# Patient Record
Sex: FEMALE | Race: WHITE | Employment: UNEMPLOYED | URBAN - METROPOLITAN AREA
[De-identification: names, ages, dates, MRNs, and addresses within clinical notes are randomized per-mention and may not be internally consistent; named-entity substitution may affect disease eponyms.]

---

## 2021-12-20 ENCOUNTER — APPOINTMENT (OUTPATIENT)
Dept: GENERAL RADIOLOGY | Age: 66
DRG: 193 | End: 2021-12-20
Payer: MEDICARE

## 2021-12-20 ENCOUNTER — HOSPITAL ENCOUNTER (INPATIENT)
Age: 66
LOS: 4 days | Discharge: HOME | DRG: 193 | End: 2021-12-24
Attending: EMERGENCY MEDICINE | Admitting: INTERNAL MEDICINE
Payer: MEDICARE

## 2021-12-20 DIAGNOSIS — R05.9 COUGH: ICD-10-CM

## 2021-12-20 DIAGNOSIS — J96.01 ACUTE RESPIRATORY FAILURE WITH HYPOXIA (HCC): Primary | ICD-10-CM

## 2021-12-20 DIAGNOSIS — J18.9 MULTIFOCAL PNEUMONIA: ICD-10-CM

## 2021-12-20 DIAGNOSIS — R51.9 NONINTRACTABLE HEADACHE, UNSPECIFIED CHRONICITY PATTERN, UNSPECIFIED HEADACHE TYPE: ICD-10-CM

## 2021-12-20 DIAGNOSIS — R52 BODY ACHES: ICD-10-CM

## 2021-12-20 DIAGNOSIS — R06.02 SHORTNESS OF BREATH: ICD-10-CM

## 2021-12-20 PROBLEM — J96.21 ACUTE AND CHRONIC RESPIRATORY FAILURE WITH HYPOXIA (HCC): Status: ACTIVE | Noted: 2021-12-20

## 2021-12-20 LAB
A/G RATIO: 1.1 (ref 1.1–2.2)
ALBUMIN SERPL-MCNC: 3.8 G/DL (ref 3.4–5)
ALP BLD-CCNC: 94 U/L (ref 40–129)
ALT SERPL-CCNC: 21 U/L (ref 10–40)
ANION GAP SERPL CALCULATED.3IONS-SCNC: 11 MMOL/L (ref 3–16)
AST SERPL-CCNC: 24 U/L (ref 15–37)
BASOPHILS ABSOLUTE: 0.1 K/UL (ref 0–0.2)
BASOPHILS RELATIVE PERCENT: 1 %
BILIRUB SERPL-MCNC: 0.7 MG/DL (ref 0–1)
BUN BLDV-MCNC: 26 MG/DL (ref 7–20)
CALCIUM SERPL-MCNC: 10 MG/DL (ref 8.3–10.6)
CHLORIDE BLD-SCNC: 100 MMOL/L (ref 99–110)
CO2: 24 MMOL/L (ref 21–32)
CREAT SERPL-MCNC: 1.2 MG/DL (ref 0.6–1.2)
EOSINOPHILS ABSOLUTE: 0.1 K/UL (ref 0–0.6)
EOSINOPHILS RELATIVE PERCENT: 1.3 %
GFR AFRICAN AMERICAN: 54
GFR NON-AFRICAN AMERICAN: 45
GLUCOSE BLD-MCNC: 110 MG/DL (ref 70–99)
HCT VFR BLD CALC: 39.4 % (ref 36–48)
HEMOGLOBIN: 13.3 G/DL (ref 12–16)
LACTIC ACID, SEPSIS: 1 MMOL/L (ref 0.4–1.9)
LYMPHOCYTES ABSOLUTE: 0.7 K/UL (ref 1–5.1)
LYMPHOCYTES RELATIVE PERCENT: 9.6 %
MCH RBC QN AUTO: 29.6 PG (ref 26–34)
MCHC RBC AUTO-ENTMCNC: 33.8 G/DL (ref 31–36)
MCV RBC AUTO: 87.6 FL (ref 80–100)
MONOCYTES ABSOLUTE: 0.7 K/UL (ref 0–1.3)
MONOCYTES RELATIVE PERCENT: 10.4 %
NEUTROPHILS ABSOLUTE: 5.4 K/UL (ref 1.7–7.7)
NEUTROPHILS RELATIVE PERCENT: 77.7 %
PDW BLD-RTO: 12.8 % (ref 12.4–15.4)
PLATELET # BLD: 259 K/UL (ref 135–450)
PMV BLD AUTO: 8.6 FL (ref 5–10.5)
POTASSIUM REFLEX MAGNESIUM: 3.7 MMOL/L (ref 3.5–5.1)
PRO-BNP: 654 PG/ML (ref 0–124)
PROCALCITONIN: 0.84 NG/ML (ref 0–0.15)
RAPID INFLUENZA  B AGN: NEGATIVE
RAPID INFLUENZA A AGN: NEGATIVE
RBC # BLD: 4.5 M/UL (ref 4–5.2)
SARS-COV-2, NAAT: NOT DETECTED
SODIUM BLD-SCNC: 135 MMOL/L (ref 136–145)
SPECIMEN STATUS: NORMAL
TOTAL PROTEIN: 7.3 G/DL (ref 6.4–8.2)
TROPONIN: <0.01 NG/ML
WBC # BLD: 6.9 K/UL (ref 4–11)

## 2021-12-20 PROCEDURE — 93005 ELECTROCARDIOGRAM TRACING: CPT | Performed by: NURSE PRACTITIONER

## 2021-12-20 PROCEDURE — 94761 N-INVAS EAR/PLS OXIMETRY MLT: CPT

## 2021-12-20 PROCEDURE — 6370000000 HC RX 637 (ALT 250 FOR IP): Performed by: NURSE PRACTITIONER

## 2021-12-20 PROCEDURE — 2700000000 HC OXYGEN THERAPY PER DAY

## 2021-12-20 PROCEDURE — 2580000003 HC RX 258: Performed by: NURSE PRACTITIONER

## 2021-12-20 PROCEDURE — 83605 ASSAY OF LACTIC ACID: CPT

## 2021-12-20 PROCEDURE — 99284 EMERGENCY DEPT VISIT MOD MDM: CPT

## 2021-12-20 PROCEDURE — 6360000002 HC RX W HCPCS: Performed by: NURSE PRACTITIONER

## 2021-12-20 PROCEDURE — 84484 ASSAY OF TROPONIN QUANT: CPT

## 2021-12-20 PROCEDURE — 85025 COMPLETE CBC W/AUTO DIFF WBC: CPT

## 2021-12-20 PROCEDURE — 2060000000 HC ICU INTERMEDIATE R&B

## 2021-12-20 PROCEDURE — 83880 ASSAY OF NATRIURETIC PEPTIDE: CPT

## 2021-12-20 PROCEDURE — 94640 AIRWAY INHALATION TREATMENT: CPT

## 2021-12-20 PROCEDURE — 87635 SARS-COV-2 COVID-19 AMP PRB: CPT

## 2021-12-20 PROCEDURE — 87804 INFLUENZA ASSAY W/OPTIC: CPT

## 2021-12-20 PROCEDURE — 80053 COMPREHEN METABOLIC PANEL: CPT

## 2021-12-20 PROCEDURE — 84145 PROCALCITONIN (PCT): CPT

## 2021-12-20 PROCEDURE — 71045 X-RAY EXAM CHEST 1 VIEW: CPT

## 2021-12-20 RX ORDER — IPRATROPIUM BROMIDE AND ALBUTEROL SULFATE 2.5; .5 MG/3ML; MG/3ML
1 SOLUTION RESPIRATORY (INHALATION) ONCE
Status: COMPLETED | OUTPATIENT
Start: 2021-12-20 | End: 2021-12-20

## 2021-12-20 RX ORDER — ACETAMINOPHEN 325 MG/1
650 TABLET ORAL EVERY 6 HOURS PRN
Status: DISCONTINUED | OUTPATIENT
Start: 2021-12-20 | End: 2021-12-24 | Stop reason: HOSPADM

## 2021-12-20 RX ORDER — ACETAMINOPHEN 500 MG
1000 TABLET ORAL EVERY 6 HOURS PRN
Status: DISCONTINUED | OUTPATIENT
Start: 2021-12-20 | End: 2021-12-24 | Stop reason: HOSPADM

## 2021-12-20 RX ORDER — BUDESONIDE AND FORMOTEROL FUMARATE DIHYDRATE 160; 4.5 UG/1; UG/1
AEROSOL RESPIRATORY (INHALATION)
COMMUNITY
Start: 2021-11-13

## 2021-12-20 RX ORDER — OMEPRAZOLE 20 MG/1
20 CAPSULE, DELAYED RELEASE ORAL DAILY
COMMUNITY

## 2021-12-20 RX ORDER — POLYETHYLENE GLYCOL 3350 17 G/17G
17 POWDER, FOR SOLUTION ORAL DAILY PRN
Status: DISCONTINUED | OUTPATIENT
Start: 2021-12-20 | End: 2021-12-24 | Stop reason: HOSPADM

## 2021-12-20 RX ORDER — ONDANSETRON 4 MG/1
4 TABLET, ORALLY DISINTEGRATING ORAL EVERY 8 HOURS PRN
Status: DISCONTINUED | OUTPATIENT
Start: 2021-12-20 | End: 2021-12-24 | Stop reason: HOSPADM

## 2021-12-20 RX ORDER — DEXAMETHASONE SODIUM PHOSPHATE 4 MG/ML
6 INJECTION, SOLUTION INTRA-ARTICULAR; INTRALESIONAL; INTRAMUSCULAR; INTRAVENOUS; SOFT TISSUE EVERY 12 HOURS
Status: DISCONTINUED | OUTPATIENT
Start: 2021-12-21 | End: 2021-12-21

## 2021-12-20 RX ORDER — GABAPENTIN 300 MG/1
300 CAPSULE ORAL NIGHTLY
Status: DISCONTINUED | OUTPATIENT
Start: 2021-12-21 | End: 2021-12-20

## 2021-12-20 RX ORDER — ALBUTEROL SULFATE 90 UG/1
AEROSOL, METERED RESPIRATORY (INHALATION)
COMMUNITY
Start: 2021-10-14

## 2021-12-20 RX ORDER — CODEINE PHOSPHATE AND GUAIFENESIN 10; 100 MG/5ML; MG/5ML
5 SOLUTION ORAL ONCE
Status: COMPLETED | OUTPATIENT
Start: 2021-12-20 | End: 2021-12-20

## 2021-12-20 RX ORDER — 0.9 % SODIUM CHLORIDE 0.9 %
1000 INTRAVENOUS SOLUTION INTRAVENOUS ONCE
Status: COMPLETED | OUTPATIENT
Start: 2021-12-20 | End: 2021-12-20

## 2021-12-20 RX ORDER — ONDANSETRON 2 MG/ML
4 INJECTION INTRAMUSCULAR; INTRAVENOUS EVERY 6 HOURS PRN
Status: DISCONTINUED | OUTPATIENT
Start: 2021-12-20 | End: 2021-12-24 | Stop reason: HOSPADM

## 2021-12-20 RX ORDER — SODIUM CHLORIDE 9 MG/ML
25 INJECTION, SOLUTION INTRAVENOUS PRN
Status: DISCONTINUED | OUTPATIENT
Start: 2021-12-20 | End: 2021-12-24 | Stop reason: HOSPADM

## 2021-12-20 RX ORDER — PANTOPRAZOLE SODIUM 40 MG/1
40 TABLET, DELAYED RELEASE ORAL
Status: DISCONTINUED | OUTPATIENT
Start: 2021-12-21 | End: 2021-12-24 | Stop reason: HOSPADM

## 2021-12-20 RX ORDER — SODIUM CHLORIDE 0.9 % (FLUSH) 0.9 %
5-40 SYRINGE (ML) INJECTION EVERY 12 HOURS SCHEDULED
Status: DISCONTINUED | OUTPATIENT
Start: 2021-12-21 | End: 2021-12-24 | Stop reason: HOSPADM

## 2021-12-20 RX ORDER — AMITRIPTYLINE HYDROCHLORIDE 25 MG/1
25 TABLET, FILM COATED ORAL NIGHTLY
Status: DISCONTINUED | OUTPATIENT
Start: 2021-12-21 | End: 2021-12-20

## 2021-12-20 RX ORDER — GUAIFENESIN 600 MG/1
1200 TABLET, EXTENDED RELEASE ORAL 2 TIMES DAILY PRN
Status: DISCONTINUED | OUTPATIENT
Start: 2021-12-20 | End: 2021-12-21

## 2021-12-20 RX ORDER — PSEUDOEPHEDRINE HYDROCHLORIDE 30 MG/1
60 TABLET ORAL EVERY 4 HOURS PRN
Status: DISCONTINUED | OUTPATIENT
Start: 2021-12-20 | End: 2021-12-24 | Stop reason: HOSPADM

## 2021-12-20 RX ORDER — SODIUM CHLORIDE 0.9 % (FLUSH) 0.9 %
5-40 SYRINGE (ML) INJECTION PRN
Status: DISCONTINUED | OUTPATIENT
Start: 2021-12-20 | End: 2021-12-24 | Stop reason: HOSPADM

## 2021-12-20 RX ORDER — SODIUM CHLORIDE 9 MG/ML
INJECTION, SOLUTION INTRAVENOUS CONTINUOUS
Status: ACTIVE | OUTPATIENT
Start: 2021-12-21 | End: 2021-12-21

## 2021-12-20 RX ORDER — ACETAMINOPHEN 650 MG/1
650 SUPPOSITORY RECTAL EVERY 6 HOURS PRN
Status: DISCONTINUED | OUTPATIENT
Start: 2021-12-20 | End: 2021-12-24 | Stop reason: HOSPADM

## 2021-12-20 RX ORDER — ESCITALOPRAM OXALATE 10 MG/1
10 TABLET ORAL DAILY
Status: DISCONTINUED | OUTPATIENT
Start: 2021-12-21 | End: 2021-12-24 | Stop reason: HOSPADM

## 2021-12-20 RX ORDER — ESCITALOPRAM OXALATE 10 MG/1
10 TABLET ORAL DAILY
COMMUNITY
Start: 2021-01-19

## 2021-12-20 RX ORDER — DEXAMETHASONE SODIUM PHOSPHATE 10 MG/ML
10 INJECTION INTRAMUSCULAR; INTRAVENOUS ONCE
Status: COMPLETED | OUTPATIENT
Start: 2021-12-20 | End: 2021-12-20

## 2021-12-20 RX ADMIN — ALBUTEROL SULFATE 5 MG: 2.5 SOLUTION RESPIRATORY (INHALATION) at 19:56

## 2021-12-20 RX ADMIN — GUAIFENESIN AND CODEINE PHOSPHATE 5 ML: 100; 10 SOLUTION ORAL at 20:06

## 2021-12-20 RX ADMIN — SODIUM CHLORIDE 1000 ML: 9 INJECTION, SOLUTION INTRAVENOUS at 20:06

## 2021-12-20 RX ADMIN — IPRATROPIUM BROMIDE AND ALBUTEROL SULFATE 1 AMPULE: .5; 2.5 SOLUTION RESPIRATORY (INHALATION) at 19:56

## 2021-12-20 RX ADMIN — DEXAMETHASONE SODIUM PHOSPHATE 10 MG: 10 INJECTION INTRAMUSCULAR; INTRAVENOUS at 18:29

## 2021-12-20 ASSESSMENT — PAIN SCALES - GENERAL: PAINLEVEL_OUTOF10: 8

## 2021-12-20 ASSESSMENT — ENCOUNTER SYMPTOMS: TACHYPNEA: 1

## 2021-12-20 NOTE — ED NOTES
Ambulate I  [place]oney around 80 feet.  02 was 91% and  Drop to 84% pulse was 90's     Ezekiel Sánchez  12/20/21 4075

## 2021-12-20 NOTE — ED PROVIDER NOTES
Evaluated by Advanced Practice Provider    EMERGENCY DEPARTMENT ENCOUNTER      CHIEFCOMPLAINT  Cough (started saturday.  ) and Concern For COVID-19 (Rapid on saturday and flu both negative, PCR completed waiting for results. Pt O2 dopped to 85% on RA with ambulation. )    CRISSY Lutz is a 77 y.o. female who presents to the emergency department with complaints of shortness of breath. Cough, TURCIOS started Saturday. Worse SOB, body aches,. Continued since. She is now loosing her voice. She is vaccinated for COVID and has had the booster. She has mycobacterium and bronchiectasis. Does not wear oxygen at home. Checking her pulse ox at home today it was in the low 80s, yesterday around 91. Fever low grade, highest was 101.7. Taking advil and tylenol. Codeine cough syrup. On 2L was still 88% while sitting in chair, Increased to 3L and 90%. Starting to have a ST with coughing Denies CP. Does hurt when she coughs. Cough is minimally productive of clear sputum. Denies vomiting diarrhea. Reports nausea. Not eating well. Tired. Went to a  on Thursday. Unknown if ill exposure. Went to  on Saturday, rapid COVID negative. Thepatient is currently rating their pain as 8/10 and describes it as an aching type of pain. Treatments tried prior to arrival in the ED include: above. The patient arrived to the ED via private car.     PAST MEDICAL HISTORY    Past Medical History:   Diagnosis Date    Arthritis     Sinus disorder        SURGICAL HISTORY    Past Surgical History:   Procedure Laterality Date     SECTION      COLONOSCOPY  10/25/10    WNL    COLONOSCOPY  9/1/15    KNEE ARTHROSCOPY Bilateral     UPPER GASTROINTESTINAL ENDOSCOPY  10/25/10    WNL       CURRENT MEDICATIONS    Current Outpatient Rx   Medication Sig Dispense Refill    amitriptyline (ELAVIL) 25 MG tablet TAKE ONE TO TWO TABLETS BY MOUTH EVERY NIGHT AT BEDTIME 30 tablet 0    meloxicam (MOBIC) 15 MG tablet i po qd PRN 30 tablet 1    acetaminophen (TYLENOL) 500 MG tablet Take 1,000 mg by mouth every 6 hours as needed for Pain      pseudoephedrine (SUDAFED) 30 MG tablet Take 60 mg by mouth every 4 hours as needed for Congestion      LORazepam (ATIVAN) 0.5 MG tablet Take 0.5 mg by mouth every 6 hours as needed for Anxiety      gabapentin (NEURONTIN) 300 MG capsule Take 1 capsule by mouth nightly 30 capsule 2    ibuprofen (ADVIL;MOTRIN) 400 MG tablet Take 400 mg by mouth every 6 hours as needed for Pain      guaiFENesin (MUCINEX) 600 MG SR tablet Take 1,200 mg by mouth 2 times daily as needed for Congestion      Loratadine (CLARITIN) 10 MG CAPS Take 10 mg by mouth daily as needed         ALLERGIES    No Known Allergies    FAMILY HISTORY    Family History   Problem Relation Age of Onset    Cancer Neg Hx        SOCIAL HISTORY    Social History     Socioeconomic History    Marital status:      Spouse name: None    Number of children: None    Years of education: None    Highest education level: None   Occupational History    None   Tobacco Use    Smoking status: Former Smoker     Start date: 9/1/2005    Smokeless tobacco: Never Used   Substance and Sexual Activity    Alcohol use: Yes     Comment: 2/wk    Drug use: No    Sexual activity: None   Other Topics Concern    None   Social History Narrative    None     Social Determinants of Health     Financial Resource Strain:     Difficulty of Paying Living Expenses: Not on file   Food Insecurity:     Worried About Running Out of Food in the Last Year: Not on file    Peyton of Food in the Last Year: Not on file   Transportation Needs:     Lack of Transportation (Medical): Not on file    Lack of Transportation (Non-Medical):  Not on file   Physical Activity:     Days of Exercise per Week: Not on file    Minutes of Exercise per Session: Not on file   Stress:     Feeling of Stress : Not on file   Social Connections:     Frequency of Communication with Friends and Family: Not on file    Frequency of Social Gatherings with Friends and Family: Not on file    Attends Scientologist Services: Not on file    Active Member of Clubs or Organizations: Not on file    Attends Club or Organization Meetings: Not on file    Marital Status: Not on file   Intimate Partner Violence:     Fear of Current or Ex-Partner: Not on file    Emotionally Abused: Not on file    Physically Abused: Not on file    Sexually Abused: Not on file   Housing Stability:     Unable to Pay for Housing in the Last Year: Not on file    Number of [place] in the Last Year: Not on file    Unstable Housing in the Last Year: Not on file       REVIEW OF SYSTEMS    10 systems reviewed, pertinent positives per HPI otherwise noted to be negative    PHYSICAL EXAM  Physical Exam  Vitals:    12/20/21 2012   BP: 93/72   Pulse: 95   Resp: 20   Temp:    SpO2: 92%     GENERAL: Patient is elderly, thin. Awake and alert. Cooperative. Sitting in chair. Moderately distressed due to her current symptoms. Patient is coughing, sounds hoarse and upon my entering the room she is on 2 L oxygen via nasal cannula and saturating at 88%. HEENT:  Normocephalic, atraumatic. Conjunctiva appear normal. Sclera is non-icteric. External ears are normal.    NECK: Supple with normal ROM. Trachea midline  LUNGS: Equal and symmetric chest rise. Breathing is unlabored. Voice sounds hoarse, cough is persistent. Lungs are clear bilaterally to auscultation but diminished throughout. Without wheezing, rales, or rhonchi. CADIOVASCULAR: Regular rate and rhythm. Normal S1-S2 sounds. No murmurs, rubs, or gallops. Capillary refill is brisk in all 4extremities. Bilateral lower extremities are equal in size, there is no swelling observed. There is no tenderness to palpation. There is no erythema observed or warmth palpated. GI: Soft, nontender, nondistended with positive bowelsounds. No rebound tenderness, guarding or any peritoneal signs. 5.0 g/dL    Albumin/Globulin Ratio 1.1 1.1 - 2.2    Total Bilirubin 0.7 0.0 - 1.0 mg/dL    Alkaline Phosphatase 94 40 - 129 U/L    ALT 21 10 - 40 U/L    AST 24 15 - 37 U/L   Troponin   Result Value Ref Range    Troponin <0.01 <0.01 ng/mL   Brain Natriuretic Peptide   Result Value Ref Range    Pro- (H) 0 - 124 pg/mL   Lactate, Sepsis   Result Value Ref Range    Lactic Acid, Sepsis 1.0 0.4 - 1.9 mmol/L   Sample possible blood bank testing   Result Value Ref Range    Specimen Status ELOISA    EKG 12 Lead   Result Value Ref Range    Ventricular Rate 78 BPM    Atrial Rate 78 BPM    P-R Interval 150 ms    QRS Duration 98 ms    Q-T Interval 380 ms    QTc Calculation (Bazett) 433 ms    P Axis 75 degrees    R Axis 72 degrees    T Axis 77 degrees    Diagnosis       Normal sinus rhythmIncomplete right bundle branch blockST & T wave abnormality, consider anterior ischemiaAbnormal ECGWhen compared with ECG of 11-JUL-2006 11:07,Previous ECG has undetermined rhythm, needs reviewIncomplete right bundle branch block is now Present       RADIOLOGY    XR CHEST PORTABLE    Result Date: 12/20/2021  EXAMINATION: ONE X-RAY VIEW OF THE CHEST 12/20/2021 5:59 pm COMPARISON: 05/18/2009 HISTORY: ORDERING SYSTEM PROVIDED HISTORY:  SOB, bronchiectasis, concern COVID TECHNOLOGIST PROVIDED HISTORY: Reason for Exam:  SOB, bronchiectasis, concern COVID Reason for Exam:  SOB, concern for COVID FINDINGS: The heart appears borderline enlarged, however this may be accentuated by portable AP technique. There is blunting of the left costophrenic sulcus, which could represent a small pleural effusion. No pneumothorax is seen. Bilateral airspace opacities are noted. Bilateral airspace opacities compatible with infection, including COVID-19 pneumonia. ED COURSE/MDM  Patient seen and evaluated. Old records reviewed. Diagnostic testing reviewed and results discussed.       I have seen and evaluated this patient with supervising physician: Shar Centeno Demetrius Muñiz MD. We thoroughly discussed the history, physical exam, diagnostic testing, emergency department course, plan and disposition. Vinicius De La Torre presented to the ED with the above noted complaints. Arrival vital signs: Afebrile, not tachycardic, BP low at 95/52. Hypoxic on room air and 2 L nasal cannula. Patient saturating 90% on 3 L nasal cannula. Physical exam performed at 1750: Breath sounds are clear but diminished throughout. Patient is hoarse with a persistent cough. She was hypoxic on arrival to the ED as stated above. Blood work: Without leukocytosis, absolute lymphocytes low at 0.7. No further differential shift. No anemia. No significant electrolyte abnormality. No evidence of acute kidney injury or transaminitis. Troponin is negative. BNP elevated at 654. Lactic acid levels normal.  Procalcitonin level has been added but is currently pending. COVID-19 swab obtained and negative. Rapid flu swab obtained and negative. EKG: Shows NSR without acute findings, nonspecific ST and T wave changes seen. Imaging: Chest x-ray shows bilateral airspace opacities compatible with infection including COVID-19 pneumonia. Medications given in the ED:   Medications   dexamethasone (DECADRON) injection 10 mg (10 mg IntraVENous Given 12/20/21 1829)   0.9 % sodium chloride bolus (1,000 mLs IntraVENous New Bag 12/20/21 2006)   guaiFENesin-codeine (GUAIFENESIN AC) 100-10 MG/5ML liquid 5 mL (5 mLs Oral Given 12/20/21 2006)   ipratropium-albuterol (DUONEB) nebulizer solution 1 ampule (1 ampule Inhalation Given 12/20/21 1956)   albuterol (PROVENTIL) nebulizer solution 5 mg (5 mg Nebulization Given 12/20/21 1956)      Patient will require admission for further evaluation and management of her acute respiratory failure with hypoxia and multifocal pneumonia.     Vinicius De La Torre will be admitted for further observation and evaluation of Omer Ward's acute respiratory failure, pneumonia as I have deemed necessary from their history, physical, and studies, I have considered and evaluated Nelly Woodson for the following diagnoses:  ACUTE CORONARY SYNDROME, CHRONIC OBSTRUCTIVE PULMONARY DISEASE, CONGESTIVE HEART FAILURE, PERICARDIAL TAMPONADE, PNEUMONIA, PNEUMOTHORAX, PULMONARY EMBOLISM, SEPSIS, and THORACIC DISSECTION. The total critical care time spent while evaluating and treating this patient was 37 minutes. This excludes time spent doing separately billable procedures. This includes time at the bedside, data interpretation, medication management, obtaining critical history from collateral sources if the patient is unable to provide it directly, and physician consultation. Specifics of interventions taken and potentially life-threatening diagnostic considerations are listed above in the medical decision making. CLINICAL IMPRESSION    1. Acute respiratory failure with hypoxia ([facility] [address]    2. Multifocal pneumonia    3. Shortness of breath    4. Nonintractable headache, unspecified chronicity pattern, unspecified headache type    5. Body aches    6. Cough       DISPOSITION  Admit. Comment: Please note this report has been produced using speech recognition software and may contain errors related to that system including errorsin grammar, punctuation, and spelling, as well as words and phrases that may be inappropriate. If there are any questions or concerns please feel free to contact the dictating provider for clarification.        Dana Woodson, ROSA MARIA - CNP  12/20/21 2124

## 2021-12-21 PROBLEM — J20.9 ACUTE BRONCHITIS: Status: ACTIVE | Noted: 2021-12-21

## 2021-12-21 PROBLEM — J47.9 BRONCHIECTASIS (HCC): Status: ACTIVE | Noted: 2021-12-21

## 2021-12-21 LAB
ANION GAP SERPL CALCULATED.3IONS-SCNC: 11 MMOL/L (ref 3–16)
BASOPHILS ABSOLUTE: 0 K/UL (ref 0–0.2)
BASOPHILS RELATIVE PERCENT: 0.2 %
BUN BLDV-MCNC: 18 MG/DL (ref 7–20)
CALCIUM SERPL-MCNC: 9.4 MG/DL (ref 8.3–10.6)
CHLORIDE BLD-SCNC: 108 MMOL/L (ref 99–110)
CO2: 23 MMOL/L (ref 21–32)
CREAT SERPL-MCNC: 0.9 MG/DL (ref 0.6–1.2)
EKG ATRIAL RATE: 78 BPM
EKG DIAGNOSIS: NORMAL
EKG P AXIS: 75 DEGREES
EKG P-R INTERVAL: 150 MS
EKG Q-T INTERVAL: 380 MS
EKG QRS DURATION: 98 MS
EKG QTC CALCULATION (BAZETT): 433 MS
EKG R AXIS: 72 DEGREES
EKG T AXIS: 77 DEGREES
EKG VENTRICULAR RATE: 78 BPM
EOSINOPHILS ABSOLUTE: 0 K/UL (ref 0–0.6)
EOSINOPHILS RELATIVE PERCENT: 0.3 %
GFR AFRICAN AMERICAN: >60
GFR NON-AFRICAN AMERICAN: >60
GLUCOSE BLD-MCNC: 203 MG/DL (ref 70–99)
HCT VFR BLD CALC: 35.9 % (ref 36–48)
HEMOGLOBIN: 12 G/DL (ref 12–16)
LYMPHOCYTES ABSOLUTE: 0.5 K/UL (ref 1–5.1)
LYMPHOCYTES RELATIVE PERCENT: 9.9 %
MAGNESIUM: 1.9 MG/DL (ref 1.8–2.4)
MCH RBC QN AUTO: 29.6 PG (ref 26–34)
MCHC RBC AUTO-ENTMCNC: 33.3 G/DL (ref 31–36)
MCV RBC AUTO: 88.9 FL (ref 80–100)
MONOCYTES ABSOLUTE: 0.3 K/UL (ref 0–1.3)
MONOCYTES RELATIVE PERCENT: 6.1 %
NEUTROPHILS ABSOLUTE: 4.3 K/UL (ref 1.7–7.7)
NEUTROPHILS RELATIVE PERCENT: 83.5 %
PDW BLD-RTO: 13.4 % (ref 12.4–15.4)
PLATELET # BLD: 222 K/UL (ref 135–450)
PMV BLD AUTO: 7.9 FL (ref 5–10.5)
POTASSIUM REFLEX MAGNESIUM: 3.2 MMOL/L (ref 3.5–5.1)
PROCALCITONIN: 0.63 NG/ML (ref 0–0.15)
RBC # BLD: 4.04 M/UL (ref 4–5.2)
SODIUM BLD-SCNC: 142 MMOL/L (ref 136–145)
WBC # BLD: 5.1 K/UL (ref 4–11)

## 2021-12-21 PROCEDURE — 6370000000 HC RX 637 (ALT 250 FOR IP): Performed by: INTERNAL MEDICINE

## 2021-12-21 PROCEDURE — 84145 PROCALCITONIN (PCT): CPT

## 2021-12-21 PROCEDURE — 6370000000 HC RX 637 (ALT 250 FOR IP): Performed by: NURSE PRACTITIONER

## 2021-12-21 PROCEDURE — 94669 MECHANICAL CHEST WALL OSCILL: CPT

## 2021-12-21 PROCEDURE — 6360000002 HC RX W HCPCS: Performed by: INTERNAL MEDICINE

## 2021-12-21 PROCEDURE — 36415 COLL VENOUS BLD VENIPUNCTURE: CPT

## 2021-12-21 PROCEDURE — 80048 BASIC METABOLIC PNL TOTAL CA: CPT

## 2021-12-21 PROCEDURE — 99222 1ST HOSP IP/OBS MODERATE 55: CPT | Performed by: INTERNAL MEDICINE

## 2021-12-21 PROCEDURE — 83735 ASSAY OF MAGNESIUM: CPT

## 2021-12-21 PROCEDURE — 2700000000 HC OXYGEN THERAPY PER DAY

## 2021-12-21 PROCEDURE — 6370000000 HC RX 637 (ALT 250 FOR IP): Performed by: HOSPITALIST

## 2021-12-21 PROCEDURE — 1200000000 HC SEMI PRIVATE

## 2021-12-21 PROCEDURE — 94761 N-INVAS EAR/PLS OXIMETRY MLT: CPT

## 2021-12-21 PROCEDURE — 6360000002 HC RX W HCPCS: Performed by: NURSE PRACTITIONER

## 2021-12-21 PROCEDURE — 93010 ELECTROCARDIOGRAM REPORT: CPT | Performed by: INTERNAL MEDICINE

## 2021-12-21 PROCEDURE — 94640 AIRWAY INHALATION TREATMENT: CPT

## 2021-12-21 PROCEDURE — 2580000003 HC RX 258: Performed by: INTERNAL MEDICINE

## 2021-12-21 PROCEDURE — 2580000003 HC RX 258: Performed by: NURSE PRACTITIONER

## 2021-12-21 PROCEDURE — 85025 COMPLETE CBC W/AUTO DIFF WBC: CPT

## 2021-12-21 RX ORDER — ALBUTEROL SULFATE 2.5 MG/3ML
2.5 SOLUTION RESPIRATORY (INHALATION) 4 TIMES DAILY
Status: DISCONTINUED | OUTPATIENT
Start: 2021-12-21 | End: 2021-12-21

## 2021-12-21 RX ORDER — GUAIFENESIN 600 MG/1
600 TABLET, EXTENDED RELEASE ORAL 2 TIMES DAILY
Status: DISCONTINUED | OUTPATIENT
Start: 2021-12-21 | End: 2021-12-24 | Stop reason: HOSPADM

## 2021-12-21 RX ORDER — ALBUTEROL SULFATE 2.5 MG/3ML
2.5 SOLUTION RESPIRATORY (INHALATION) EVERY 4 HOURS PRN
Status: DISCONTINUED | OUTPATIENT
Start: 2021-12-21 | End: 2021-12-24 | Stop reason: HOSPADM

## 2021-12-21 RX ORDER — CODEINE PHOSPHATE AND GUAIFENESIN 10; 100 MG/5ML; MG/5ML
5 SOLUTION ORAL EVERY 4 HOURS PRN
Status: DISCONTINUED | OUTPATIENT
Start: 2021-12-21 | End: 2021-12-24 | Stop reason: HOSPADM

## 2021-12-21 RX ORDER — POTASSIUM CHLORIDE 20 MEQ/1
40 TABLET, EXTENDED RELEASE ORAL ONCE
Status: COMPLETED | OUTPATIENT
Start: 2021-12-21 | End: 2021-12-21

## 2021-12-21 RX ORDER — ACETYLCYSTEINE 200 MG/ML
600 SOLUTION ORAL; RESPIRATORY (INHALATION) 2 TIMES DAILY
Status: DISCONTINUED | OUTPATIENT
Start: 2021-12-21 | End: 2021-12-24 | Stop reason: HOSPADM

## 2021-12-21 RX ORDER — ALBUTEROL SULFATE 2.5 MG/3ML
2.5 SOLUTION RESPIRATORY (INHALATION) 3 TIMES DAILY
Status: DISCONTINUED | OUTPATIENT
Start: 2021-12-21 | End: 2021-12-24

## 2021-12-21 RX ADMIN — GUAIFENESIN AND CODEINE PHOSPHATE 5 ML: 10; 100 LIQUID ORAL at 15:13

## 2021-12-21 RX ADMIN — POTASSIUM CHLORIDE 40 MEQ: 1500 TABLET, EXTENDED RELEASE ORAL at 18:10

## 2021-12-21 RX ADMIN — SODIUM CHLORIDE: 9 INJECTION, SOLUTION INTRAVENOUS at 00:14

## 2021-12-21 RX ADMIN — PANTOPRAZOLE SODIUM 40 MG: 40 TABLET, DELAYED RELEASE ORAL at 20:53

## 2021-12-21 RX ADMIN — DEXAMETHASONE SODIUM PHOSPHATE 6 MG: 4 INJECTION, SOLUTION INTRAMUSCULAR; INTRAVENOUS at 00:08

## 2021-12-21 RX ADMIN — ESCITALOPRAM OXALATE 10 MG: 10 TABLET ORAL at 08:57

## 2021-12-21 RX ADMIN — ALBUTEROL SULFATE 2.5 MG: 2.5 SOLUTION RESPIRATORY (INHALATION) at 20:09

## 2021-12-21 RX ADMIN — GUAIFENESIN AND CODEINE PHOSPHATE 5 ML: 10; 100 LIQUID ORAL at 21:29

## 2021-12-21 RX ADMIN — SODIUM CHLORIDE, PRESERVATIVE FREE 10 ML: 5 INJECTION INTRAVENOUS at 20:53

## 2021-12-21 RX ADMIN — DEXAMETHASONE SODIUM PHOSPHATE 6 MG: 4 INJECTION, SOLUTION INTRAMUSCULAR; INTRAVENOUS at 12:10

## 2021-12-21 RX ADMIN — CEFTRIAXONE SODIUM 1000 MG: 1 INJECTION, POWDER, FOR SOLUTION INTRAMUSCULAR; INTRAVENOUS at 15:08

## 2021-12-21 RX ADMIN — GUAIFENESIN 600 MG: 600 TABLET, EXTENDED RELEASE ORAL at 20:53

## 2021-12-21 RX ADMIN — ACETYLCYSTEINE 600 MG: 200 SOLUTION ORAL; RESPIRATORY (INHALATION) at 20:09

## 2021-12-21 RX ADMIN — ENOXAPARIN SODIUM 40 MG: 100 INJECTION SUBCUTANEOUS at 08:56

## 2021-12-21 RX ADMIN — AZITHROMYCIN MONOHYDRATE 500 MG: 500 INJECTION, POWDER, LYOPHILIZED, FOR SOLUTION INTRAVENOUS at 15:45

## 2021-12-21 RX ADMIN — SODIUM CHLORIDE 25 ML: 9 INJECTION, SOLUTION INTRAVENOUS at 15:05

## 2021-12-21 RX ADMIN — ALBUTEROL SULFATE 2.5 MG: 2.5 SOLUTION RESPIRATORY (INHALATION) at 16:22

## 2021-12-21 ASSESSMENT — ENCOUNTER SYMPTOMS
EYES NEGATIVE: 1
ALLERGIC/IMMUNOLOGIC NEGATIVE: 1
SHORTNESS OF BREATH: 1
GASTROINTESTINAL NEGATIVE: 1
COUGH: 1

## 2021-12-21 NOTE — ED NOTES
Pt ambulated to bathroom with steady gait, no deficits noted by this RN     April [facility]  12/20/21 9622

## 2021-12-21 NOTE — PROGRESS NOTES
4 Eyes Skin Assessment     The patient is being assess for  Admission    I agree that 2 RN's have performed a thorough Head to Toe Skin Assessment on the patient. ALL assessment sites listed below have been assessed. Areas assessed by both nurses:   [x]   Head, Face, and Ears   [x]   Shoulders, Back, and Chest  [x]   Arms, Elbows, and Hands   [x]   Coccyx, Sacrum, and Ischum  [x]   Legs, Feet, and Heels        Does the Patient have Skin Breakdown?   No         Kyle Prevention initiated:  No   Wound Care Orders initiated:  No      [facility] nurse consulted for Pressure Injury (Stage 3,4, Unstageable, DTI, NWPT, and Complex wounds):  No      Nurse 1 eSignature: Electronically signed by Gia Sierra RN on 12/21/21 at 1:18 AM EST    **SHARE this note so that the co-signing nurse is able to place an eSignature**    Nurse 2 eSignature: Electronically signed by Sheila Hamilton RN on 12/22/21 at 10:36 PM EST

## 2021-12-21 NOTE — PROGRESS NOTES
End of shift report given to Kevin Walker and Avelino Senior RNs. Call light within reach, bed in lowest position, no needs at this time.

## 2021-12-21 NOTE — CARE COORDINATION
CASE MANAGEMENT INITIAL ASSESSMENT      Reviewed chart and completed assessment with patient:yes  Explained Case Management role/services. yes    Primary contact information:, amy    Health Care Decision Maker :           Can this person be reached and be able to respond quickly, such as within a few minutes or hours? Yes      Admit date/status:12/20/21  Royal Harden   Is this a Readmission?:  No      Insurance:Destinee Mcintyre required for SNF: waived due to COVID       3 night stay required: No    Living arrangements, Adls, care needs, prior to admission:home with , Independent in [facility] at home:  Walker__Cane__RTS__ BSC__Shower Chair__  02__ HHN__ CPAP__  BiPap__  Hospital Bed__ W/C___ Other__________    Services in the home and/or outpatient, prior to [address] (if applicable)   · Name:  · Address:  · Dialysis Schedule:  · Phone:  · Fax:    PT/OT recs:    Hospital Exemption Notification (HEN):    Barriers to discharge:medical complications    Plan/comments:Referred to patient for d/c planning. Spoke to patient. Patient is a 77year old female admitted for pneumonia. Patient usually lives at home with . Patient reports she is independent in ADLs. Patient currently denies d/c needs.   Electronically signed by MODESTA Amanda LISW-S on 12/21/2021 at 10:16 AM      [facility] on chart for MD signature

## 2021-12-21 NOTE — ED PROVIDER NOTES
Emergency Department Attending Provider Note  Location: [address] [place] [address]  ED  12/20/2021     Patient Identification  Rodrigue Starr is a 77 y.o. female      Rodrigue Starr was evaluated in the Emergency Department for cough shortness of breath. Noted to be in hypoxic respiratory failure requiring supplemental O2 by nasal cannula. Denies any chest pain. Chalino Moyer Physical exam revealed:  Vital signs reviewed  Gen: Alert and oriented, no acute distress  Card: RRR, no murmurs, equal radial pulses  Resp: CBSBL, no wheezes rales or rhonchi  Abd: Soft nontender, nondistended abdomen, no guarding or rebound, no CVA tenderness  Ext: No deformities noted  Neuro: Grossly normal moving extremities equally      EKG Interpretation  Normal sinus rhythm rate of 80 normal axis normal intervals no evidence of conduction abnormalities no diagnostic ischemic changes noted nonspecific ST deviations noted in the septal leads no prior for comparison no STEMI criteria     Patient seen and evaluated. Relevant records reviewed. 75-year-old female presents with shortness of breath found to be in hypoxic respiratory failure. Chest x-ray showing evidence of multifocal pneumonia suspicious for COVID-19. Her story is consistent with viral infection such as COVID-19 however her rapid swabs are negative. She does not have a leukocytosis. I do feel viral infection is more likely than bacterial pneumonia and we have held antibiotics for that reason. Does not appear to be consistent with sepsis due to bacterial cause. Will discuss with medicine about giving empiric antibiotics. Doubt PE or ACS. Plan for admission. Although initial history and physical exam information was obtained by PAWEL/NPP/MD/ (who also dictated a record of this visit), I independently examined and evaluated this patient and made all diagnostic, treatment, and disposition decisions.     This chart was generated in part by using [employer] system and may contain errors related to that system including errors in grammar, punctuation, and spelling, as well as words and phrases that may be inappropriate. If there are any questions or concerns please feel free to contact the dictating provider for clarification.      Bita Villeda MD  [address] Mcinty[address] MD  12/20/21 5176

## 2021-12-21 NOTE — RT PROTOCOL NOTE
RT Nebulizer Bronchodilator Protocol Note    There is a bronchodilator order in the chart from a provider indicating to follow the RT Bronchodilator Protocol and there is an Initiate RT Bronchodilator Protocol order as well (see protocol at bottom of note). CXR Findings:  XR CHEST PORTABLE    Result Date: 12/21/2021  Bilateral airspace opacities compatible with infection, including COVID-19 pneumonia. The findings from the last RT Protocol Assessment were as follows:  Smoking: Chronic pulmonary disease  Respiratory Pattern: Mild dyspnea at rest, irregular pattern, or RR 21-25 bpm  Breath Sounds: Slightly diminished and/or crackles  Cough: Strong, productive  Indication for Bronchodilator Therapy: Mucolytic ordered,On home bronchodilators  Bronchodilator Assessment Score: 9    Aerosolized bronchodilator medication orders have been revised according to the RT Nebulizer Bronchodilator Protocol below. Respiratory Therapist to perform RT Therapy Protocol Assessment initially then follow the protocol. Repeat RT Therapy Protocol Assessment PRN for score 0-3 or on second treatment, BID, and PRN for scores above 3. No Indications - adjust the frequency to every 6 hours PRN wheezing or bronchospasm, if no treatments needed after 48 hours then discontinue using Per Protocol order mode. If indication present, adjust the RT bronchodilator orders based on the Bronchodilator Assessment Score as indicated below. If a patient is on this medication at home then do not decrease Frequency below that used at home. 0-3 - enter or revise RT bronchodilator order(s) to equivalent RT Bronchodilator order with Frequency of every 4 hours PRN for wheezing or increased work of breathing using Per Protocol order mode.        4-6 - enter or revise RT Bronchodilator order(s) to two equivalent RT bronchodilator orders with one order with BID Frequency and one order with Frequency of every 4 hours PRN wheezing or increased

## 2021-12-21 NOTE — PROGRESS NOTES
Hospitalist Progress Note    Date of Admission: 12/20/2021    Chief Complaint: SOB, cough    Hospital Course:   77 y.o. female, with essentially no PMH, who presented to [facility] with shortness of breath and cough. History obtained from the patient and review of EMR. The patient stated she has been experiencing shortness of breath with a dry cough since Friday night. She stated on Saturday she went to Urgent care and had a Rapid COVID and Influenza test which were both negative. The patient stated a PCR was sent for COVID, but she is still waiting on results. She stated over the last 2 days her shortness of breath and cough has been getting progressively worse. The patient stated today she began to have a fever of 101 as well as body aches. She stated she was monitoring her SPO2 at home and it was anywhere from 88-91% so she went to the ED for further evaluation. The patient stated she is vaccinated for COVID and has her booster. She continues to feel bad despite taking advil, tylenol and codeine cough syrup. In the emergency room the patients SPO2 decreased to 84% on RA and she was placed on 3 LNC. a CXR was obtained that revealed bilateral airspace opacities compatible with infection including  COVID 19 pneumonia. The patients rapid COVID and influenza here at [facility] were also negative. She was given steroids and breathing treatments in the ED. 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 Faibana Easley from [facility] pulmonology. Subjective: Ongoing mildly productive cough, minimal SOB. O2 requirement stable at 3L NC.      Labs:   Recent Labs     12/20/21  1838 12/21/21  0608   WBC 6.9 5.1   HGB 13.3 12.0   HCT 39.4 35.9*    222     Recent Labs     12/20/21  1838 12/21/21  0608   * 142   K 3.7 3.2*    108   CO2 24 23   BUN 26* 18   CREATININE 1.2 0.9   CALCIUM 10.0 9.4     Recent Labs     12/20/21  1838   AST 24   ALT 21   BILITOT 0.7 ALKPHOS 94     No results for input(s): INR in the last 72 hours. Physical Exam Performed:    /68   Pulse 85   Temp 97.9 °F (36.6 °C) (Oral)   Resp 20   Ht 5' 4\" (1.626 m)   Wt 148 lb 14.4 oz (67.5 kg)   SpO2 90%   BMI 25.56 kg/m²     General appearance:  No apparent distress, appears stated age and cooperative. HEENT:  Pupils equal, round, and reactive to light. Conjunctivae/corneas clear. Neck:  Supple, no jugular venous distention. Trachea midline with full range of motion. Respiratory:  Normal respiratory effort. Focal crackles at left base, otherwise clear to auscultation, bilaterally without Rales/Wheezes/Rhonchi. Cardiovascular:  Regular rate and rhythm with normal S1/S2 without murmurs, rubs or gallops. Abdomen:  Soft, non-tender, non-distended with normal bowel sounds. Musculoskelatal:  No clubbing, cyanosis or edema bilaterally. Full range of motion without deformity. Neurologic:  Neurovascularly intact without any focal sensory/motor deficits. Cranial nerves: II-XII intact, grossly non-focal.  Psychiatric:  Alert and oriented, thought content appropriate, normal insight  Skin:  Skin color, texture, turgor normal.  No rashes or lesions. Capillary Refill:  Brisk,< 3 seconds   Peripheral Pulses:  +2 palpable, equal bilaterally     Assessment/Plan:    Active Hospital Problems    Diagnosis     Bronchiectasis ([facility] [address] [J47.9]     Acute bronchitis [J20.9]     Pneumonia [J18.9]     Acute and chronic respiratory failure with hypoxia ([facility]) [J96.21]      Acute respiratory failure with hypoxia, Possible Pneumonia vs Acute Bronchitis in setting of Bronchiectasis:  -CXR showed bilateral airspace opacities but with increased infiltrative process in left base concerning for pneumonia, -Rapid COVID negative. Recent negative COVID PCR as outpatient on Friday.    -influenza a and b negative  -Can stop Decadron  -Given above infiltrates, elevated Procalcitonin, fevers, would treat with empiric Abx for CAP vs Acute Bronchitis  -Rocephin and Ceftriaxone started  -Pulmonology consulted for input given underlying Bronchiectasis  -Wean O2 as tolerated    DVT Prophylaxis: Lovenox  Diet: ADULT DIET;  Regular  Code Status: Full Code  PT/OT Eval Status: NA    Dispo - 1-2 days     Pablo Johnston MD

## 2021-12-21 NOTE — PLAN OF CARE
Problem: Infection:  Goal: Will remain free from infection  Description: Will remain free from infection  Outcome: Ongoing     Problem: Safety:  Goal: Free from accidental physical injury  Description: Free from accidental physical injury  Outcome: Ongoing     Problem: Daily Care:  Goal: Daily care needs are met  Description: Daily care needs are met  12/21/2021 1357 by Kellee Mane RN  Outcome: Ongoing

## 2021-12-21 NOTE — ED NOTES
Provided pt food and drink per admitting orders of regular diet     April Carissa, [address] [facility]  12/20/21 2057

## 2021-12-21 NOTE — CONSULTS
Consult placed    Who: [facility] - Per ICU, MD Paged  Date:12/21/2021,  Valentino Pimenta AM        Electronically signed by Anatoly George on 12/21/2021 at 9:54 AM

## 2021-12-21 NOTE — H&P
Hospital Medicine History & Physical      PCP: Marcos Truong MD    Date of Admission: 12/20/2021    Date of Service: Pt seen/examined on 12/20/2021 and Admitted to Inpatient with expected LOS greater than two midnights due to medical therapy. Chief Complaint:  Shortness of breath and cough    History Of Present Illness:      77 y.o. female, with essentially no PMH, who presented to [facility] with shortness of breath and cough. History obtained from the patient and review of EMR. The patient stated she has been experiencing shortness of breath with a dry cough since Friday night. She stated on Saturday she went to Urgent care and had a Rapid COVID and Influenza test which were both negative. The patient stated a PCR was sent for COVID, but she is still waiting on results. She stated over the last 2 days her shortness of breath and cough has been getting progressively worse. The patient stated today she began to have a fever of 101 as well as body aches. She stated she was monitoring her SPO2 at home and it was anywhere from 88-91% so she went to the ED for further evaluation. The patient stated she is vaccinated for COVID and has her booster. She continues to feel bad despite taking advil, tylenol and codeine cough syrup. In the emergency room the patients SPO2 decreased to 84% on RA and she was placed on 3 LNC. a CXR was obtained that revealed bilateral airspace opacities compatible with infection including  COVID 19 pneumonia. The patients rapid COVID and influenza here at [facility] were also negative. She was given steroids and breathing treatments in the ED. 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 Aaron Jimenez from [facility] pulmonology. She will be admitted for further evaluation and treatment. The patient denied any other associated symptoms as well as any aggravating and/or alleviating factors.  At the time of this assessment, the patient was resting comfortably in bed. She currently denies any chest pain, back pain, abdominal pain, shortness of breath, numbness, tingling, N/V/C/D, fever and/or chills. Past Medical History:          Diagnosis Date    Arthritis     Sinus disorder      Past Surgical History:          Procedure Laterality Date     SECTION      COLONOSCOPY  10/25/10    WNL    COLONOSCOPY  9/1/15    KNEE ARTHROSCOPY Bilateral     UPPER GASTROINTESTINAL ENDOSCOPY  10/25/10    WNL     Medications Prior to Admission:      Prior to Admission medications    Medication Sig Start Date End Date Taking? Authorizing Provider   amitriptyline (ELAVIL) 25 MG tablet TAKE ONE TO TWO TABLETS BY MOUTH EVERY NIGHT AT BEDTIME 16   Juliet Chpaman PA-C   meloxicam [facility]) 15 MG tablet i po qd PRN 16   Juliet Chapman PA-C   acetaminophen (TYLENOL) 500 MG tablet Take 1,000 mg by mouth every 6 hours as needed for Pain    Historical Provider, MD   pseudoephedrine (SUDAFED) 30 MG tablet Take 60 mg by mouth every 4 hours as needed for Congestion    Historical Provider, MD   LORazepam (ATIVAN) 0.5 MG tablet Take 0.5 mg by mouth every 6 hours as needed for Anxiety    Historical Provider, MD   gabapentin (NEURONTIN) 300 MG capsule Take 1 capsule by mouth nightly 16   Lasha Harrison MD   ibuprofen (ADVIL;MOTRIN) 400 MG tablet Take 400 mg by mouth every 6 hours as needed for Pain    Historical Provider, MD   guaiFENesin (MUCINEX) 600 MG SR tablet Take 1,200 mg by mouth 2 times daily as needed for Congestion    Historical Provider, MD   Loratadine (CLARITIN) 10 MG CAPS Take 10 mg by mouth daily as needed    Historical Provider, MD     Allergies:  Patient has no known allergies. Social History:      The patient currently lives at home    TOBACCO:   reports that she has quit smoking. She started smoking about 16 years ago. She has never used smokeless tobacco.  ETOH:   reports current alcohol use.   E-Cigarettes/Vaping Use Questions Responses    E-Cigarette/Vaping Use     Start Date     Passive Exposure     Quit Date     Counseling Given     Comments         Family History:      Reviewed in detail. Positive as follows:        Problem Relation Age of Onset    Cancer Neg Hx      REVIEW OF SYSTEMS COMPLETED:   Pertinent positives as noted in the HPI. All other systems reviewed and negative. PHYSICAL EXAM PERFORMED:    BP 98/61   Pulse 82   Temp 98.8 °F (37.1 °C) (Oral)   Resp 24   Ht 5' 4\" (1.626 m)   Wt 143 lb (64.9 kg)   SpO2 92%   BMI 24.55 kg/m²     General appearance:  Pleasant female in no apparent distress, appears stated age and cooperative. HEENT:  Pupils equal, round, and reactive to light. Wears glasses  Extra ocular muscles intact. Conjunctivae/corneas clear. Neck: Supple, with full range of motion. No jugular venous distention. Trachea midline. Respiratory:  Normal respiratory effort. Clear to auscultation, bilaterally without Rales/Wheezes/Rhonchi. 3 LNC  Cardiovascular:  Regular rate and rhythm with normal S1/S2 without murmurs, rubs or gallops. Abdomen: Soft, non-tender, non-distended with normal bowel sounds. Musculoskeletal:  No clubbing, cyanosis or edema bilaterally. Full range of motion without deformity. Skin: Skin color, texture, turgor normal.  No significant rashes or lesions. Neurologic:  Neurovascularly intact.  Cranial nerves: II-XII intact, grossly non-focal.  Psychiatric:  Alert and oriented, thought content appropriate, normal insight  Capillary Refill: Brisk,3 seconds, normal  Peripheral Pulses: +2 palpable, equal bilaterally     Labs:     Recent Labs     12/20/21 1838   WBC 6.9   HGB 13.3   HCT 39.4        Recent Labs     12/20/21  1838   *   K 3.7      CO2 24   BUN 26*   CREATININE 1.2   CALCIUM 10.0     Recent Labs     12/20/21 1838   AST 24   ALT 21   BILITOT 0.7   ALKPHOS 94     Recent Labs     12/20/21 1838   TROPONINI <0.01     Urinalysis:      Lab Results Component Value Date    NITRU Negative 08/29/2015    BLOODU Negative 08/29/2015    SPECGRAV 1.015 08/29/2015    GLUCOSEU Negative 08/29/2015     Radiology:     CXR: I have reviewed the CXR with the following interpretation: bilateral airspace opacities compatible with infection including  COVID 19 pneumonia    EKG:  I have reviewed the EKG with the following interpretation: The Ekg interpreted in the absence of a cardiologist shows normal sinus rhythmIncomplete right bundle branch blockST & T wave abnormality, consider anterior ischemiaAbnormal ECGWhen compared with ECG of 11-JUL-2006 11:07,Previous ECG has undetermined rhythm, needs reviewIncomplete right bundle branch block is now Present    XR CHEST PORTABLE   Preliminary Result   Bilateral airspace opacities compatible with infection, including COVID-19   pneumonia. ASSESSMENT:    Active Hospital Problems    Diagnosis Date Noted    Pneumonia [J18.9] 12/20/2021    Acute respiratory failure with hypoxia ([facility] [address] [J96.01] 12/20/2021     PLAN:    Acute respiratory failure with hypoxia in setting of pneumonia. SPO2 84% on RA, now requiring 3 LNC  -CXR revealed: bilateral airspace opacities compatible with infection including  COVID 19 pneumonia  -influenza a and b negative  -COVID negative  -1 L NS given in ED  -guaifenesin given in D  -albuterol given in ED  -decadron given in ED  -nebulizer given in ED and continue prn  -continue mucinex  -continue sudafed  -encourage coughing and deep breathing  -IS  -decadron bid x 10 days    DVT Prophylaxis: Lovenox    Diet: No diet orders on file     Code Status: No Order    PT/OT Eval Status: No indication for need at this time    Dispo - 2-3 days pending clinical improvment     Nguyen Gibbons, APRN - CNP    Thank you Lea Siemens, MD for the opportunity to be involved in this patient's care.  If you have any questions or concerns please feel free to contact me at [phone].  ---------------------------------------Dr. Smitha Andrade--------------------------

## 2021-12-21 NOTE — CONSULTS
Consult Note            Date:12/21/2021        Patient Name:Sabi Ward     YOB: 1955     Age:66 y.o. Inpatient consult to Pulmonology  Consult performed by: Byron Espinoza MD  Consult ordered by: Jovan Carlos MD  Reason for consult: Cystic fibrosis, history of MILADY, bronchiectasis, bronchitis          Chief Complaint     Chief Complaint   Patient presents with    Cough     started saturday.  Concern For COVID-19     Rapid on saturday and flu both negative, PCR completed waiting for results. Pt O2 dopped to 85% on RA with ambulation. Cystic fibrosis, history of MILADY, bronchiectasis, bronchitis    History Obtained From   patient, electronic medical record    History of Present Illness   This is a 70-year-old female who has a diagnosis of cystic fibrosis and MILADY in the past has been on 15 months of therapy for MILADY. Patient reports that she starting to have issues with shortness of breath congestion and hypoxemia. She normally follows with  cystic fibrosis clinic. And was told to come to the closest hospital because of her O2 sats being low. Patient does not normally wear oxygen and was placed on oxygen here in the hospital.  Patient reports that she has been having some congestion. She normally takes Symbicort and albuterol along with a nebulizer with normal saline. Patient does not use vest therapy but does use Acapella. Patient reports that her mucus has been getting thicker and having a little bit of trouble getting this up. She normally does not wear oxygen. She does report that she does snore, has witnessed apnea, fatigue and sleepiness during the daytime. Dry mouth at night also has headaches during the morning hours. These headaches do extend into the middle of the day also and is being seen by a ENT for this. Patient does have dry mouth in the middle of the night and does have issues with nocturia at times.   Patient has never had a sleep work-up but normally sleeps on her side. Past Medical History     Past Medical History:   Diagnosis Date    Arthritis     Sinus disorder         Past Surgical History     Past Surgical History:   Procedure Laterality Date     SECTION      COLONOSCOPY  10/25/10    WNL    COLONOSCOPY  9/1/15    KNEE ARTHROSCOPY Bilateral     UPPER GASTROINTESTINAL ENDOSCOPY  10/25/10    WNL        Medications     Prior to Admission medications    Medication Sig Start Date End Date Taking?  Authorizing Provider   escitalopram (LEXAPRO) 10 MG tablet Take 10 mg by mouth daily 21  Yes Historical Provider, MD   omeprazole (PRILOSEC) 20 MG delayed release capsule Take 20 mg by mouth daily   Yes Historical Provider, MD   albuterol sulfate  (90 Base) MCG/ACT inhaler  10/14/21  Yes Historical Provider, MD   budesonide-formoterol (SYMBICORT) 160-4.5 MCG/ACT AERO  21  Yes Historical Provider, MD   meloxicam (MOBIC) 15 MG tablet i po qd PRN 16   Juliet Fry PA-C   acetaminophen (TYLENOL) 500 MG tablet Take 1,000 mg by mouth every 6 hours as needed for Pain    Historical Provider, MD   pseudoephedrine (SUDAFED) 30 MG tablet Take 60 mg by mouth every 4 hours as needed for Congestion    Historical Provider, MD   LORazepam (ATIVAN) 0.5 MG tablet Take 0.5 mg by mouth every 6 hours as needed for Anxiety    Historical Provider, MD   ibuprofen (ADVIL;MOTRIN) 400 MG tablet Take 400 mg by mouth every 6 hours as needed for Pain    Historical Provider, MD   guaiFENesin (MUCINEX) 600 MG SR tablet Take 1,200 mg by mouth 2 times daily as needed for Congestion    Historical Provider, MD   Loratadine (CLARITIN) 10 MG CAPS Take 10 mg by mouth daily as needed    Historical Provider, MD roqueFENesin-codeine (GUAIFENESIN AC) 100-10 MG/5ML liquid 5 mL, Q4H PRN  azithromycin (ZITHROMAX) 500 mg in D5W 250ml addavial, Q24H  cefTRIAXone (ROCEPHIN) 1000 mg IVPB in 50 mL D5W minibag, Q24H  acetaminophen (TYLENOL) tablet 1,000 mg, Right Ear: External ear normal.      Left Ear: External ear normal.      Nose: Nose normal.      Mouth/Throat:      Mouth: Mucous membranes are moist.      Pharynx: Oropharynx is clear. Comments: Mallampati 2  Eyes:      General: No scleral icterus. Extraocular Movements: Extraocular movements intact. Conjunctiva/sclera: Conjunctivae normal.      Pupils: Pupils are equal, round, and reactive to light. Cardiovascular:      Rate and Rhythm: Normal rate and regular rhythm. Pulses: Normal pulses. Heart sounds: Normal heart sounds. No murmur heard. No friction rub. Pulmonary:      Effort: No respiratory distress. Breath sounds: No stridor. No wheezing, rhonchi or rales. Chest:      Chest wall: No tenderness. Abdominal:      General: Abdomen is flat. Bowel sounds are normal. There is no distension. Tenderness: There is no abdominal tenderness. There is no guarding. Musculoskeletal:         General: No swelling or tenderness. Normal range of motion. Cervical back: Normal range of motion and neck supple. No rigidity. Skin:     General: Skin is warm and dry. Coloration: Skin is not jaundiced. Neurological:      General: No focal deficit present. Mental Status: She is alert and oriented to person, place, and time. Mental status is at baseline. Cranial Nerves: No cranial nerve deficit. Sensory: No sensory deficit. Motor: No weakness. Gait: Gait normal.   Psychiatric:         Mood and Affect: Mood normal.         Thought Content:  Thought content normal.         Judgment: Judgment normal.         Labs    CBC:  Recent Labs     12/20/21 1838 12/21/21  0608   WBC 6.9 5.1   RBC 4.50 4.04   HGB 13.3 12.0   HCT 39.4 35.9*   MCV 87.6 88.9   RDW 12.8 13.4    222     CHEMISTRIES:  Recent Labs     12/20/21  1838 12/21/21  0608   * 142   K 3.7 3.2*    108   CO2 24 23   BUN 26* 18   CREATININE 1.2 0.9   GLUCOSE 110* 203*   MG  --  1.90 PT/INR:No results for input(s): PROTIME, INR in the last 72 hours. APTT:No results for input(s): APTT in the last 72 hours. LIVER PROFILE:  Recent Labs     12/20/21 1838   AST 24   ALT 21   BILITOT 0.7   ALKPHOS 94       Imaging/Diagnostics   XR CHEST PORTABLE    Result Date: 12/21/2021  Bilateral airspace opacities compatible with infection, including COVID-19 pneumonia.        Assessment      Hospital Problems           Last Modified POA    Pneumonia 12/20/2021 Yes    Acute respiratory failure with hypoxia ([address] 12/20/2021 Yes    Acute and chronic respiratory failure with hypoxia ([address] 12/20/2021 Yes          Plan   Bronchiectasis with acute bronchitis  Chest x-ray showed slight increase in infiltrative process but could not be 100% as the patient does have an underlying bronchiectasis  We will go ahead and get a procalcitonin  We will start on azithromycin      We will start on Mucomyst with albuterol nebulized twice daily  We will start on vest therapy  We will start on Mucinex  Continue with antibiotics      Obstructive sleep apnea  Would suggest that she get a sleep study as she does have problems with this      GERD   continue with Protonix        Thank you for the consult will follow    Electronically signed by Josey Joseph MD on 12/21/21 at 3:22 PM EST

## 2021-12-21 NOTE — PROGRESS NOTES

## 2021-12-22 LAB
ANION GAP SERPL CALCULATED.3IONS-SCNC: 11 MMOL/L (ref 3–16)
BUN BLDV-MCNC: 16 MG/DL (ref 7–20)
CALCIUM SERPL-MCNC: 9 MG/DL (ref 8.3–10.6)
CHLORIDE BLD-SCNC: 108 MMOL/L (ref 99–110)
CO2: 25 MMOL/L (ref 21–32)
CREAT SERPL-MCNC: 0.6 MG/DL (ref 0.6–1.2)
GFR AFRICAN AMERICAN: >60
GFR NON-AFRICAN AMERICAN: >60
GLUCOSE BLD-MCNC: 138 MG/DL (ref 70–99)
POTASSIUM REFLEX MAGNESIUM: 3.7 MMOL/L (ref 3.5–5.1)
SODIUM BLD-SCNC: 144 MMOL/L (ref 136–145)

## 2021-12-22 PROCEDURE — 36415 COLL VENOUS BLD VENIPUNCTURE: CPT

## 2021-12-22 PROCEDURE — 2580000003 HC RX 258: Performed by: NURSE PRACTITIONER

## 2021-12-22 PROCEDURE — 6360000002 HC RX W HCPCS: Performed by: INTERNAL MEDICINE

## 2021-12-22 PROCEDURE — 94640 AIRWAY INHALATION TREATMENT: CPT

## 2021-12-22 PROCEDURE — 99232 SBSQ HOSP IP/OBS MODERATE 35: CPT | Performed by: INTERNAL MEDICINE

## 2021-12-22 PROCEDURE — 6370000000 HC RX 637 (ALT 250 FOR IP): Performed by: INTERNAL MEDICINE

## 2021-12-22 PROCEDURE — 94669 MECHANICAL CHEST WALL OSCILL: CPT

## 2021-12-22 PROCEDURE — 6370000000 HC RX 637 (ALT 250 FOR IP): Performed by: NURSE PRACTITIONER

## 2021-12-22 PROCEDURE — 80048 BASIC METABOLIC PNL TOTAL CA: CPT

## 2021-12-22 PROCEDURE — 94761 N-INVAS EAR/PLS OXIMETRY MLT: CPT

## 2021-12-22 PROCEDURE — 2580000003 HC RX 258: Performed by: INTERNAL MEDICINE

## 2021-12-22 PROCEDURE — 2700000000 HC OXYGEN THERAPY PER DAY

## 2021-12-22 PROCEDURE — 6360000002 HC RX W HCPCS: Performed by: NURSE PRACTITIONER

## 2021-12-22 PROCEDURE — 1200000000 HC SEMI PRIVATE

## 2021-12-22 RX ADMIN — CEFTRIAXONE SODIUM 1000 MG: 1 INJECTION, POWDER, FOR SOLUTION INTRAMUSCULAR; INTRAVENOUS at 16:37

## 2021-12-22 RX ADMIN — ALBUTEROL SULFATE 2.5 MG: 2.5 SOLUTION RESPIRATORY (INHALATION) at 07:41

## 2021-12-22 RX ADMIN — SODIUM CHLORIDE, PRESERVATIVE FREE 10 ML: 5 INJECTION INTRAVENOUS at 09:13

## 2021-12-22 RX ADMIN — ESCITALOPRAM OXALATE 10 MG: 10 TABLET ORAL at 09:13

## 2021-12-22 RX ADMIN — GUAIFENESIN 600 MG: 600 TABLET, EXTENDED RELEASE ORAL at 09:13

## 2021-12-22 RX ADMIN — PANTOPRAZOLE SODIUM 40 MG: 40 TABLET, DELAYED RELEASE ORAL at 05:06

## 2021-12-22 RX ADMIN — GUAIFENESIN AND CODEINE PHOSPHATE 5 ML: 10; 100 LIQUID ORAL at 09:13

## 2021-12-22 RX ADMIN — SODIUM CHLORIDE, PRESERVATIVE FREE 10 ML: 5 INJECTION INTRAVENOUS at 20:59

## 2021-12-22 RX ADMIN — SODIUM CHLORIDE 25 ML: 9 INJECTION, SOLUTION INTRAVENOUS at 16:35

## 2021-12-22 RX ADMIN — GUAIFENESIN 600 MG: 600 TABLET, EXTENDED RELEASE ORAL at 20:58

## 2021-12-22 RX ADMIN — ACETYLCYSTEINE 600 MG: 200 SOLUTION ORAL; RESPIRATORY (INHALATION) at 07:42

## 2021-12-22 RX ADMIN — ALBUTEROL SULFATE 2.5 MG: 2.5 SOLUTION RESPIRATORY (INHALATION) at 11:58

## 2021-12-22 RX ADMIN — ACETYLCYSTEINE 600 MG: 200 SOLUTION ORAL; RESPIRATORY (INHALATION) at 20:16

## 2021-12-22 RX ADMIN — GUAIFENESIN AND CODEINE PHOSPHATE 5 ML: 10; 100 LIQUID ORAL at 05:08

## 2021-12-22 RX ADMIN — ENOXAPARIN SODIUM 40 MG: 100 INJECTION SUBCUTANEOUS at 09:13

## 2021-12-22 RX ADMIN — GUAIFENESIN AND CODEINE PHOSPHATE 5 ML: 10; 100 LIQUID ORAL at 20:59

## 2021-12-22 RX ADMIN — AZITHROMYCIN MONOHYDRATE 500 MG: 500 INJECTION, POWDER, LYOPHILIZED, FOR SOLUTION INTRAVENOUS at 17:50

## 2021-12-22 RX ADMIN — ALBUTEROL SULFATE 2.5 MG: 2.5 SOLUTION RESPIRATORY (INHALATION) at 20:16

## 2021-12-22 ASSESSMENT — PAIN SCALES - GENERAL
PAINLEVEL_OUTOF10: 0

## 2021-12-22 NOTE — PROGRESS NOTES
Patient: Rodrick Lisa MRN: [id number]  Date of  Admission: 12/20/2021   YOB: 1955  Age: 77 y.o. Sex: female    Unit: [facility] TELEMETRY  Room/Bed: 0207/0207-01 Admitting Physician: Case ROMERO    Attending Physician:  Dilia Fox MD         Pulmonary Service Note  Chief Complaint   Patient presents with    Cough       started saturday.  Concern For COVID-19       Rapid on saturday and flu both negative, PCR completed waiting for results. Pt O2 dopped to 85% on RA with ambulation. Cystic fibrosis, history of MILADY, bronchiectasis, bronchitis     History Obtained From   patient, electronic medical record     History of Present Illness   This is a 71-year-old female who has a diagnosis of cystic fibrosis and MILADY in the past has been on 15 months of therapy for MILADY. Patient reports that she starting to have issues with shortness of breath congestion and hypoxemia. She normally follows with  cystic fibrosis clinic. And was told to come to the closest hospital because of her O2 sats being low. Patient does not normally wear oxygen and was placed on oxygen here in the hospital.  Patient reports that she has been having some congestion. She normally takes Symbicort and albuterol along with a nebulizer with normal saline. Patient does not use vest therapy but does use Acapella. Patient reports that her mucus has been getting thicker and having a little bit of trouble getting this up. She normally does not wear oxygen.     She does report that she does snore, has witnessed apnea, fatigue and sleepiness during the daytime. Dry mouth at night also has headaches during the morning hours. These headaches do extend into the middle of the day also and is being seen by a ENT for this. Patient does have dry mouth in the middle of the night and does have issues with nocturia at times.   Patient has never had a sleep work-up but normally sleeps on her side.           SUBJECTIVE:    Still requiring some oxygen  Have try to take her off the oxygen however O2 sats dropped into the 88%  Does not feel congested  Offered bronchoscopy but she wanted to hold off  Still feeling like she is open in the airways but she still feels that she is short of breath    ROS:  A comprehensive review of systems was negative except for: above    OBJECTIVE    Medications    Continuous Infusions:   sodium chloride 25 mL (12/21/21 1505)       Scheduled Meds:   azithromycin  500 mg IntraVENous Q24H    cefTRIAXone (ROCEPHIN) IV  1,000 mg IntraVENous Q24H    acetylcysteine  600 mg Inhalation BID    guaiFENesin  600 mg Oral BID    albuterol  2.5 mg Nebulization TID    sodium chloride flush  5-40 mL IntraVENous 2 times per day    enoxaparin  40 mg SubCUTAneous Daily    escitalopram  10 mg Oral Daily    pantoprazole  40 mg Oral QAM AC       PRN Meds:  guaiFENesin-codeine, albuterol, acetaminophen, pseudoephedrine, sodium chloride flush, sodium chloride, ondansetron **OR** ondansetron, polyethylene glycol, acetaminophen **OR** acetaminophen    Physical    Patient Vitals for the past 24 hrs:   BP Temp Temp src Pulse Resp SpO2   12/22/21 1213 122/68 97.9 °F (36.6 °C) Oral 80 -- 92 %   12/22/21 1200 -- -- -- -- -- 90 %   12/22/21 0804 128/68 98.1 °F (36.7 °C) Oral 78 20 90 %   12/22/21 0745 -- -- -- -- -- 91 %   12/22/21 0502 125/70 98.7 °F (37.1 °C) Oral 79 14 94 %   12/22/21 0038 125/73 98.6 °F (37 °C) Oral 80 18 91 %   12/21/21 2011 -- -- -- -- -- 92 %   12/21/21 2009 -- -- -- -- -- (!) 88 %   12/21/21 2001 120/72 98.1 °F (36.7 °C) Oral 74 16 92 %   12/21/21 1717 116/68 97.9 °F (36.6 °C) Oral 85 20 90 %   12/21/21 1622 -- -- -- -- 18 92 %        Physical Exam  Vitals and nursing note reviewed. Constitutional:       General: She is not in acute distress. Appearance: Normal appearance. HENT:      Head: Normocephalic and atraumatic.       Right Ear: External ear normal.      Left Ear: External ear normal.      Nose: Nose normal.      Mouth/Throat:      Mouth: Mucous membranes are moist.      Pharynx: Oropharynx is clear. Eyes:      General:         Right eye: No discharge. Left eye: No discharge. Extraocular Movements: Extraocular movements intact. Conjunctiva/sclera: Conjunctivae normal.      Pupils: Pupils are equal, round, and reactive to light. Cardiovascular:      Rate and Rhythm: Normal rate and regular rhythm. Pulses: Normal pulses. Heart sounds: No murmur heard. Pulmonary:      Effort: No respiratory distress. Breath sounds: No stridor. No wheezing or rhonchi. Comments: Decreased breath sounds bilaterally  Chest:      Chest wall: No tenderness. Abdominal:      General: Bowel sounds are normal. There is no distension. Palpations: Abdomen is soft. There is no mass. Tenderness: There is no abdominal tenderness. Hernia: No hernia is present. Musculoskeletal:         General: No swelling. Normal range of motion. Cervical back: Normal range of motion. No rigidity. Skin:     General: Skin is warm and dry. Coloration: Skin is not jaundiced or pale. Neurological:      General: No focal deficit present. Mental Status: She is alert and oriented to person, place, and time. Mental status is at baseline. Cranial Nerves: No cranial nerve deficit. Sensory: No sensory deficit. Psychiatric:         Mood and Affect: Mood normal.         Behavior: Behavior normal.         Thought Content: Thought content normal.              Weight  Weight change:       Labs:   Recent Labs     12/20/21  1838 12/21/21  0608   WBC 6.9 5.1   HGB 13.3 12.0   HCT 39.4 35.9*    222      Recent Labs     12/20/21  1838 12/21/21  0608 12/22/21  0716   * 142 144   K 3.7 3.2* 3.7    108 108   CO2 24 23 25   BUN 26* 18 16   GLUCOSE 110* 203* 138*       Additional labs      Radiology, images personally reviewed.   Not indicated        Assessment:     Active Problems:    Pneumonia    Acute and chronic respiratory failure with hypoxia (HCC)    Bronchiectasis (HCC)    Acute bronchitis  Resolved Problems:    * No resolved hospital problems. *      Discussion /Plan:     Bronchiectasis with acute bronchitis  Chest x-ray showed slight increase in infiltrative process but could not be 100% as the patient does have an underlying bronchiectasis  Procalcitonin is positive    Continue with  Azithromycin  Rocephin  Mucomyst with albuterol nebulized  Mucinex  Vest therapy    I had offered to do a bronchoscopy however she wanted to wait and see how the antibiotics helped  On maximal therapy at this time we will see how she does  Continue to wean oxygen as tolerated        Obstructive sleep apnea  Would suggest that she get a sleep study as she does have problems with this        GERD   continue with Protonix             Josey Joseph MD    [facility] Pulmonary, Critical Care and Sleep Medicine  [phone]      Please note that some or all of this record was generated using voice recognition software. If there are any questions about the content of this document, please contact the author as some errors in transcription may have occurred.

## 2021-12-22 NOTE — CARE COORDINATION
Pt a/o, able to ambulate, has PCP and insurance. Pt currently on 3 L NC, wears 0 at home. CM will continue to follow for needs.   Katja Allen RN

## 2021-12-22 NOTE — PLAN OF CARE
Problem: Infection:  Goal: Will remain free from infection  Description: Will remain free from infection  12/22/2021 0147 by Francisco Martin RN  Outcome: Ongoing  Note: Continue to monitor pt progress. Problem: Daily Care:  Goal: Daily care needs are met  Description: Daily care needs are met  12/22/2021 0147 by Francisco Martin RN  Outcome: Ongoing  Note: /73   Pulse 80   Temp 98.6 °F (37 °C) (Oral)   Resp 18   Ht 5' 4\" (1.626 m)   Wt 148 lb 14.4 oz (67.5 kg)   SpO2 91%   BMI 25.56 kg/m²        Problem: Pain:  Goal: Patient's pain/discomfort is manageable  Description: Patient's pain/discomfort is manageable  Outcome: Ongoing  Note: Pt will be satisfied with pain control. Pt uses numeric pain rating scale with reassessments after pain med administration. Will continue to monitor progression throughout shift.

## 2021-12-22 NOTE — PROGRESS NOTES
Hospitalist Progress Note    Date of Admission: 12/20/2021    Chief Complaint: SOB, cough    Hospital Course:   77 y.o. female, with essentially no PMH, who presented to [facility] with shortness of breath and cough. History obtained from the patient and review of EMR. The patient stated she has been experiencing shortness of breath with a dry cough since Friday night. She stated on Saturday she went to Urgent care and had a Rapid COVID and Influenza test which were both negative. The patient stated a PCR was sent for COVID, but she is still waiting on results. She stated over the last 2 days her shortness of breath and cough has been getting progressively worse. The patient stated today she began to have a fever of 101 as well as body aches. She stated she was monitoring her SPO2 at home and it was anywhere from 88-91% so she went to the ED for further evaluation. The patient stated she is vaccinated for COVID and has her booster. She continues to feel bad despite taking advil, tylenol and codeine cough syrup. In the emergency room the patients SPO2 decreased to 84% on RA and she was placed on 3 LNC. CXR was obtained that revealed bilateral airspace opacities compatible with infection including  COVID 19 pneumonia. The patients rapid COVID and influenza here at [facility] were also negative. She was given steroids and breathing treatments in the ED. 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 Gaylyn Paget from [facility] pulmonology. Subjective: Cough is more productive cough, using minimal SOB. O2 requirement stable at 3L NC.      Labs:   Recent Labs     12/20/21  1838 12/21/21  0608   WBC 6.9 5.1   HGB 13.3 12.0   HCT 39.4 35.9*    222     Recent Labs     12/20/21  1838 12/21/21  0608 12/22/21  0716   * 142 144   K 3.7 3.2* 3.7    108 108   CO2 24 23 25   BUN 26* 18 16   CREATININE 1.2 0.9 0.6   CALCIUM 10.0 9.4 9.0     Recent Labs 12/20/21  1838   AST 24   ALT 21   BILITOT 0.7   ALKPHOS 94     No results for input(s): INR in the last 72 hours. Physical Exam Performed:    /68   Pulse 78   Temp 98.1 °F (36.7 °C) (Oral)   Resp 20   Ht 5' 4\" (1.626 m)   Wt 148 lb 14.4 oz (67.5 kg)   SpO2 90%   BMI 25.56 kg/m²     General appearance:  No apparent distress, appears stated age and cooperative. HEENT:  Pupils equal, round, and reactive to light. Conjunctivae/corneas clear. Neck:  Supple, no jugular venous distention. Trachea midline with full range of motion. Respiratory:  Normal respiratory effort. Focal crackles at left base, otherwise clear to auscultation, bilaterally without Rales/Wheezes/Rhonchi. Cardiovascular:  Regular rate and rhythm with normal S1/S2 without murmurs, rubs or gallops. Abdomen:  Soft, non-tender, non-distended with normal bowel sounds. Musculoskelatal:  No clubbing, cyanosis or edema bilaterally. Full range of motion without deformity. Neurologic:  Neurovascularly intact without any focal sensory/motor deficits. Cranial nerves: II-XII intact, grossly non-focal.  Psychiatric:  Alert and oriented, thought content appropriate, normal insight  Skin:  Skin color, texture, turgor normal.  No rashes or lesions. Capillary Refill:  Brisk,< 3 seconds   Peripheral Pulses:  +2 palpable, equal bilaterally     Assessment/Plan:    Active Hospital Problems    Diagnosis     Bronchiectasis ([facility] [address] [J47.9]     Acute bronchitis [J20.9]     Pneumonia [J18.9]     Acute and chronic respiratory failure with hypoxia (HCC) [J96.21]      Acute respiratory failure with hypoxia, Possible Pneumonia vs Acute Bronchitis in setting of Bronchiectasis:  -CXR showed bilateral airspace opacities but with increased infiltrative process in left base concerning for pneumonia, -Rapid COVID negative. Recent negative COVID PCR as outpatient on Friday.    -influenza a and b negative  -Given above infiltrates, elevated Procalcitonin, fevers, would treat with empiric Abx for CAP vs Acute Bronchitis  -Continue Rocephin and Ceftriaxone  -Pulmonology consulted for input given underlying Bronchiectasis  -Continue pulmonary toilet with chest vest, acapella, mucinex  -Wean O2 as tolerated    DVT Prophylaxis: Lovenox  Diet: ADULT DIET; Regular  Code Status: Full Code  PT/OT Eval Status: NA    Dispo - 1-2 days, likely with home O2.      Carlos Enrique Fleming MD

## 2021-12-23 LAB
ANION GAP SERPL CALCULATED.3IONS-SCNC: 9 MMOL/L (ref 3–16)
BUN BLDV-MCNC: 16 MG/DL (ref 7–20)
CALCIUM SERPL-MCNC: 8.7 MG/DL (ref 8.3–10.6)
CHLORIDE BLD-SCNC: 105 MMOL/L (ref 99–110)
CO2: 28 MMOL/L (ref 21–32)
CREAT SERPL-MCNC: 0.7 MG/DL (ref 0.6–1.2)
GFR AFRICAN AMERICAN: >60
GFR NON-AFRICAN AMERICAN: >60
GLUCOSE BLD-MCNC: 98 MG/DL (ref 70–99)
POTASSIUM REFLEX MAGNESIUM: 3.6 MMOL/L (ref 3.5–5.1)
SODIUM BLD-SCNC: 142 MMOL/L (ref 136–145)

## 2021-12-23 PROCEDURE — 87102 FUNGUS ISOLATION CULTURE: CPT

## 2021-12-23 PROCEDURE — 99153 MOD SED SAME PHYS/QHP EA: CPT | Performed by: INTERNAL MEDICINE

## 2021-12-23 PROCEDURE — 2700000000 HC OXYGEN THERAPY PER DAY

## 2021-12-23 PROCEDURE — 0BC18ZZ EXTIRPATION OF MATTER FROM TRACHEA, VIA NATURAL OR ARTIFICIAL OPENING ENDOSCOPIC: ICD-10-PCS | Performed by: INTERNAL MEDICINE

## 2021-12-23 PROCEDURE — 6360000002 HC RX W HCPCS: Performed by: NURSE PRACTITIONER

## 2021-12-23 PROCEDURE — 6360000002 HC RX W HCPCS: Performed by: INTERNAL MEDICINE

## 2021-12-23 PROCEDURE — 2580000003 HC RX 258: Performed by: INTERNAL MEDICINE

## 2021-12-23 PROCEDURE — 2500000003 HC RX 250 WO HCPCS: Performed by: INTERNAL MEDICINE

## 2021-12-23 PROCEDURE — 80048 BASIC METABOLIC PNL TOTAL CA: CPT

## 2021-12-23 PROCEDURE — 94761 N-INVAS EAR/PLS OXIMETRY MLT: CPT

## 2021-12-23 PROCEDURE — 94640 AIRWAY INHALATION TREATMENT: CPT

## 2021-12-23 PROCEDURE — 3609027000 HC BRONCHOSCOPY: Performed by: INTERNAL MEDICINE

## 2021-12-23 PROCEDURE — 6370000000 HC RX 637 (ALT 250 FOR IP): Performed by: INTERNAL MEDICINE

## 2021-12-23 PROCEDURE — 94669 MECHANICAL CHEST WALL OSCILL: CPT

## 2021-12-23 PROCEDURE — 36415 COLL VENOUS BLD VENIPUNCTURE: CPT

## 2021-12-23 PROCEDURE — 2709999900 HC NON-CHARGEABLE SUPPLY: Performed by: INTERNAL MEDICINE

## 2021-12-23 PROCEDURE — 87206 SMEAR FLUORESCENT/ACID STAI: CPT

## 2021-12-23 PROCEDURE — 87081 CULTURE SCREEN ONLY: CPT

## 2021-12-23 PROCEDURE — 7100000010 HC PHASE II RECOVERY - FIRST 15 MIN: Performed by: INTERNAL MEDICINE

## 2021-12-23 PROCEDURE — 31624 DX BRONCHOSCOPE/LAVAGE: CPT | Performed by: INTERNAL MEDICINE

## 2021-12-23 PROCEDURE — 87116 MYCOBACTERIA CULTURE: CPT

## 2021-12-23 PROCEDURE — 1200000000 HC SEMI PRIVATE

## 2021-12-23 PROCEDURE — 87015 SPECIMEN INFECT AGNT CONCNTJ: CPT

## 2021-12-23 PROCEDURE — 99152 MOD SED SAME PHYS/QHP 5/>YRS: CPT | Performed by: INTERNAL MEDICINE

## 2021-12-23 PROCEDURE — 87205 SMEAR GRAM STAIN: CPT

## 2021-12-23 PROCEDURE — 87631 RESP VIRUS 3-5 TARGETS: CPT

## 2021-12-23 PROCEDURE — 2580000003 HC RX 258: Performed by: NURSE PRACTITIONER

## 2021-12-23 PROCEDURE — 87632 RESP VIRUS 6-11 TARGETS: CPT

## 2021-12-23 PROCEDURE — 87070 CULTURE OTHR SPECIMN AEROBIC: CPT

## 2021-12-23 PROCEDURE — 7100000011 HC PHASE II RECOVERY - ADDTL 15 MIN: Performed by: INTERNAL MEDICINE

## 2021-12-23 PROCEDURE — 6370000000 HC RX 637 (ALT 250 FOR IP): Performed by: NURSE PRACTITIONER

## 2021-12-23 PROCEDURE — 99233 SBSQ HOSP IP/OBS HIGH 50: CPT | Performed by: INTERNAL MEDICINE

## 2021-12-23 RX ORDER — SODIUM CHLORIDE 9 MG/ML
25 INJECTION, SOLUTION INTRAVENOUS PRN
Status: DISCONTINUED | OUTPATIENT
Start: 2021-12-23 | End: 2021-12-23 | Stop reason: HOSPADM

## 2021-12-23 RX ORDER — LIDOCAINE HYDROCHLORIDE 20 MG/ML
SOLUTION OROPHARYNGEAL PRN
Status: DISCONTINUED | OUTPATIENT
Start: 2021-12-23 | End: 2021-12-23 | Stop reason: ALTCHOICE

## 2021-12-23 RX ORDER — SODIUM CHLORIDE 0.9 % (FLUSH) 0.9 %
5-40 SYRINGE (ML) INJECTION PRN
Status: DISCONTINUED | OUTPATIENT
Start: 2021-12-23 | End: 2021-12-23 | Stop reason: HOSPADM

## 2021-12-23 RX ORDER — SODIUM CHLORIDE 0.9 % (FLUSH) 0.9 %
5-40 SYRINGE (ML) INJECTION EVERY 12 HOURS SCHEDULED
Status: DISCONTINUED | OUTPATIENT
Start: 2021-12-23 | End: 2021-12-23 | Stop reason: HOSPADM

## 2021-12-23 RX ORDER — FENTANYL CITRATE 50 UG/ML
INJECTION, SOLUTION INTRAMUSCULAR; INTRAVENOUS PRN
Status: DISCONTINUED | OUTPATIENT
Start: 2021-12-23 | End: 2021-12-23 | Stop reason: ALTCHOICE

## 2021-12-23 RX ORDER — LIDOCAINE HYDROCHLORIDE 20 MG/ML
INJECTION, SOLUTION EPIDURAL; INFILTRATION; INTRACAUDAL; PERINEURAL PRN
Status: DISCONTINUED | OUTPATIENT
Start: 2021-12-23 | End: 2021-12-23 | Stop reason: ALTCHOICE

## 2021-12-23 RX ORDER — MIDAZOLAM HYDROCHLORIDE 5 MG/ML
INJECTION INTRAMUSCULAR; INTRAVENOUS PRN
Status: DISCONTINUED | OUTPATIENT
Start: 2021-12-23 | End: 2021-12-23 | Stop reason: ALTCHOICE

## 2021-12-23 RX ORDER — MAGNESIUM HYDROXIDE 1200 MG/15ML
LIQUID ORAL CONTINUOUS PRN
Status: COMPLETED | OUTPATIENT
Start: 2021-12-23 | End: 2021-12-23

## 2021-12-23 RX ORDER — FENTANYL CITRATE 50 UG/ML
100 INJECTION, SOLUTION INTRAMUSCULAR; INTRAVENOUS ONCE
Status: DISCONTINUED | OUTPATIENT
Start: 2021-12-23 | End: 2021-12-23 | Stop reason: HOSPADM

## 2021-12-23 RX ORDER — MIDAZOLAM HYDROCHLORIDE 1 MG/ML
5 INJECTION, SOLUTION INTRAMUSCULAR; INTRAVENOUS ONCE
Status: DISCONTINUED | OUTPATIENT
Start: 2021-12-23 | End: 2021-12-23 | Stop reason: HOSPADM

## 2021-12-23 RX ADMIN — GUAIFENESIN 600 MG: 600 TABLET, EXTENDED RELEASE ORAL at 20:08

## 2021-12-23 RX ADMIN — ALBUTEROL SULFATE 2.5 MG: 2.5 SOLUTION RESPIRATORY (INHALATION) at 15:37

## 2021-12-23 RX ADMIN — GUAIFENESIN AND CODEINE PHOSPHATE 5 ML: 10; 100 LIQUID ORAL at 09:14

## 2021-12-23 RX ADMIN — CEFTRIAXONE SODIUM 1000 MG: 1 INJECTION, POWDER, FOR SOLUTION INTRAMUSCULAR; INTRAVENOUS at 16:36

## 2021-12-23 RX ADMIN — ALBUTEROL SULFATE 2.5 MG: 2.5 SOLUTION RESPIRATORY (INHALATION) at 19:30

## 2021-12-23 RX ADMIN — ACETAMINOPHEN 1000 MG: 500 TABLET ORAL at 20:07

## 2021-12-23 RX ADMIN — ACETYLCYSTEINE 600 MG: 200 SOLUTION ORAL; RESPIRATORY (INHALATION) at 11:52

## 2021-12-23 RX ADMIN — ONDANSETRON 4 MG: 4 TABLET, ORALLY DISINTEGRATING ORAL at 20:08

## 2021-12-23 RX ADMIN — ONDANSETRON 4 MG: 4 TABLET, ORALLY DISINTEGRATING ORAL at 01:03

## 2021-12-23 RX ADMIN — ESCITALOPRAM OXALATE 10 MG: 10 TABLET ORAL at 09:14

## 2021-12-23 RX ADMIN — SODIUM CHLORIDE, PRESERVATIVE FREE 10 ML: 5 INJECTION INTRAVENOUS at 20:09

## 2021-12-23 RX ADMIN — SODIUM CHLORIDE 25 ML: 9 INJECTION, SOLUTION INTRAVENOUS at 16:35

## 2021-12-23 RX ADMIN — SODIUM CHLORIDE 25 ML: 9 INJECTION, SOLUTION INTRAVENOUS at 17:43

## 2021-12-23 RX ADMIN — AZITHROMYCIN MONOHYDRATE 500 MG: 500 INJECTION, POWDER, LYOPHILIZED, FOR SOLUTION INTRAVENOUS at 17:45

## 2021-12-23 RX ADMIN — GUAIFENESIN 600 MG: 600 TABLET, EXTENDED RELEASE ORAL at 09:14

## 2021-12-23 RX ADMIN — PANTOPRAZOLE SODIUM 40 MG: 40 TABLET, DELAYED RELEASE ORAL at 06:08

## 2021-12-23 RX ADMIN — ACETYLCYSTEINE 600 MG: 200 SOLUTION ORAL; RESPIRATORY (INHALATION) at 19:30

## 2021-12-23 RX ADMIN — ONDANSETRON 4 MG: 2 INJECTION INTRAMUSCULAR; INTRAVENOUS at 16:31

## 2021-12-23 RX ADMIN — ALBUTEROL SULFATE 2.5 MG: 2.5 SOLUTION RESPIRATORY (INHALATION) at 08:45

## 2021-12-23 ASSESSMENT — PAIN - FUNCTIONAL ASSESSMENT
PAIN_FUNCTIONAL_ASSESSMENT: 0-10
PAIN_FUNCTIONAL_ASSESSMENT: PREVENTS OR INTERFERES SOME ACTIVE ACTIVITIES AND ADLS

## 2021-12-23 ASSESSMENT — PAIN DESCRIPTION - DESCRIPTORS: DESCRIPTORS: ACHING;HEADACHE;SORE

## 2021-12-23 ASSESSMENT — PAIN DESCRIPTION - PAIN TYPE: TYPE: CHRONIC PAIN;ACUTE PAIN

## 2021-12-23 ASSESSMENT — PAIN DESCRIPTION - ORIENTATION: ORIENTATION: ANTERIOR

## 2021-12-23 ASSESSMENT — PAIN SCALES - GENERAL
PAINLEVEL_OUTOF10: 1
PAINLEVEL_OUTOF10: 0
PAINLEVEL_OUTOF10: 3

## 2021-12-23 ASSESSMENT — PAIN DESCRIPTION - ONSET: ONSET: ON-GOING

## 2021-12-23 ASSESSMENT — PAIN DESCRIPTION - FREQUENCY: FREQUENCY: INTERMITTENT

## 2021-12-23 ASSESSMENT — PAIN DESCRIPTION - PROGRESSION: CLINICAL_PROGRESSION: GRADUALLY WORSENING

## 2021-12-23 ASSESSMENT — PAIN DESCRIPTION - LOCATION: LOCATION: HEAD;BACK

## 2021-12-23 NOTE — CARE COORDINATION
Pt now w/pulm on board, recommending bronch. Pt refusing at this time, wanting to see if IV ABX improves pt status. She is open to procedure should abx not produce results. CM will continue to follow for needs. CM will continue to follow for possible home O2 needs; Pt remains ambulatory.   Franklin Pfeiffer RN

## 2021-12-23 NOTE — PROGRESS NOTES
Hospitalist Progress Note    Date of Admission: 12/20/2021    Chief Complaint: SOB, cough    Hospital Course:   77 y.o. female, with essentially no PMH, who presented to [facility] with shortness of breath and cough. History obtained from the patient and review of EMR. The patient stated she has been experiencing shortness of breath with a dry cough since Friday night. She stated on Saturday she went to Urgent care and had a Rapid COVID and Influenza test which were both negative. The patient stated a PCR was sent for COVID, but she is still waiting on results. She stated over the last 2 days her shortness of breath and cough has been getting progressively worse. The patient stated today she began to have a fever of 101 as well as body aches. She stated she was monitoring her SPO2 at home and it was anywhere from 88-91% so she went to the ED for further evaluation. The patient stated she is vaccinated for COVID and has her booster. She continues to feel bad despite taking advil, tylenol and codeine cough syrup. In the emergency room the patients SPO2 decreased to 84% on RA and she was placed on 3 LNC. CXR was obtained that revealed bilateral airspace opacities compatible with infection including  COVID 19 pneumonia. The patients rapid COVID and influenza here at [facility] were also negative. She was given steroids and breathing treatments in the ED. 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 Edda Cota from [facility] pulmonology. Subjective: She reports worsening SOB with increased work of breathing today. Increased O2 requirement to 5L NC. She is agreeable to Bronchoscopy today.      Labs:   Recent Labs     12/20/21  1838 12/21/21  0608   WBC 6.9 5.1   HGB 13.3 12.0   HCT 39.4 35.9*    222     Recent Labs     12/21/21  0608 12/22/21  0716 12/23/21  0643    144 142   K 3.2* 3.7 3.6    108 105   CO2 23 25 28   BUN 18 16 16   CREATININE 0.9 0.6 0. 7   CALCIUM 9.4 9.0 8.7     Recent Labs     12/20/21  1838   AST 24   ALT 21   BILITOT 0.7   ALKPHOS 94     No results for input(s): INR in the last 72 hours. Physical Exam Performed:    /61   Pulse 87   Temp 98.4 °F (36.9 °C) (Oral)   Resp 22   Ht 5' 4\" (1.626 m)   Wt 148 lb 14.4 oz (67.5 kg)   SpO2 (S) 90%   BMI 25.56 kg/m²     General appearance:  No apparent distress, appears stated age and cooperative. HEENT:  Pupils equal, round, and reactive to light. Conjunctivae/corneas clear. Neck:  Supple, no jugular venous distention. Trachea midline with full range of motion. Respiratory:  Normal respiratory effort. Limited air movement noted, with faint crackles at left base, otherwise clear to auscultation, bilaterally without Rales/Wheezes/Rhonchi. Cardiovascular:  Regular rate and rhythm with normal S1/S2 without murmurs, rubs or gallops. Abdomen:  Soft, non-tender, non-distended with normal bowel sounds. Musculoskelatal:  No clubbing, cyanosis or edema bilaterally. Full range of motion without deformity. Neurologic:  Neurovascularly intact without any focal sensory/motor deficits. Cranial nerves: II-XII intact, grossly non-focal.  Psychiatric:  Alert and oriented, thought content appropriate, normal insight  Skin:  Skin color, texture, turgor normal.  No rashes or lesions. Capillary Refill:  Brisk,< 3 seconds   Peripheral Pulses:  +2 palpable, equal bilaterally     Assessment/Plan:    Active Hospital Problems    Diagnosis     Bronchiectasis ([facility] [address] [J47.9]     Acute bronchitis [J20.9]     Pneumonia [J18.9]     Acute and chronic respiratory failure with hypoxia ([facility]) [J96.21]      Acute respiratory failure with hypoxia, Possible Pneumonia vs Acute Bronchitis in setting of Bronchiectasis:  -CXR showed bilateral airspace opacities but with increased infiltrative process in left base concerning for pneumonia, -Rapid COVID negative. Recent negative COVID PCR as outpatient on Friday. -influenza a and b negative  -Given above infiltrates, elevated Procalcitonin, fevers, would treat with empiric Abx for CAP vs Acute Bronchitis  -Continue Rocephin and Ceftriaxone  -Pulmonology consulted for input given underlying Bronchiectasis  -Continue pulmonary toilet with chest vest, acapella, mucinex  -Given worsening symptoms/hypoxia, she is agreeable to Bronchoscopy  -Wean O2 as tolerated    DVT Prophylaxis: Lovenox  Diet: ADULT DIET; Regular  Code Status: Full Code  PT/OT Eval Status: NA    Dispo - 1-2 days pending improvement and cultures, likely with home O2.      Adrianna Jones MD

## 2021-12-23 NOTE — OP NOTE
Operative Note      Patient: Kodi Warner  YOB: 1955  MRN: [id number]    Date of Procedure: 12/23/2021    Pre-Op Diagnosis: bronchiectasis    Post-Op Diagnosis: Same       Procedure(s):  BRONCHOSCOPY DIAGNOSTIC OR CELL [address] ONLY    Surgeon(s):  Audra Dance, MD    Assistant:   * No surgical staff found *    Anesthesia: IV Sedation    Estimated Blood Loss (mL): less than 50     Complications: None    Specimens:   * No specimens in log *    Implants:  * No implants in log *      Drains: * No LDAs found *    Findings: Thick mucus plugging    Detailed Description of Procedure:   Consent was signed, timeout was done, patient was identified, procedure was identified. Patient was given sedation. Versed 4 mg IV and fentanyl 100 mcg IV patient tolerated medication well. Attempted to go through the nares however the right naris was bloody and small in the left naris was not existent with a deviated septum. Had to go to the oral airway. Patient was had a mouth guard placed. This was then placed then went down into the posterior pharynx were cords were visualized the approximated well incised local lichen. Into the trachea trachea is a  With local lidocaine. Washing there was done upon entering the airways there was thick mucous plugs seen throughout the airway and washing the airway was done both right and left bronchial tree anesthetized with local lichen washing the airway was done. Thick mucous plugs were taken out of the airways throughout both lungs. Patient tolerated procedure well. Will send for cultures. Normally complications.     Electronically signed by Josias Iglesias MD on 12/23/2021 at 3:16 PM

## 2021-12-23 NOTE — PROGRESS NOTES
Patient: Wale Kingston MRN: [id number]  Date of  Admission: 12/20/2021   YOB: 1955  Age: 77 y.o. Sex: female    Unit: [facility]  Room/Bed: 0207/0207-01 Admitting Physician: Meredith ROMERO    Attending Physician:  Sam Chin MD         Pulmonary Service Note  Chief Complaint   Patient presents with    Cough       started saturday.  Concern For COVID-19       Rapid on saturday and flu both negative, PCR completed waiting for results. Pt O2 dopped to 85% on RA with ambulation. Cystic fibrosis, history of MILADY, bronchiectasis, bronchitis     History Obtained From   patient, electronic medical record     History of Present Illness   This is a 55-year-old female who has a diagnosis of cystic fibrosis and MILADY in the past has been on 15 months of therapy for MILADY. Patient reports that she starting to have issues with shortness of breath congestion and hypoxemia. She normally follows with  cystic fibrosis clinic. And was told to come to the closest hospital because of her O2 sats being low. Patient does not normally wear oxygen and was placed on oxygen here in the hospital.  Patient reports that she has been having some congestion. She normally takes Symbicort and albuterol along with a nebulizer with normal saline. Patient does not use vest therapy but does use Acapella. Patient reports that her mucus has been getting thicker and having a little bit of trouble getting this up. She normally does not wear oxygen.     She does report that she does snore, has witnessed apnea, fatigue and sleepiness during the daytime. Dry mouth at night also has headaches during the morning hours. These headaches do extend into the middle of the day also and is being seen by a ENT for this. Patient does have dry mouth in the middle of the night and does have issues with nocturia at times.   Patient has never had a sleep work-up but normally sleeps on her side.           SUBJECTIVE:    Still requiring some oxygen  Have try to take her off the oxygen however O2 sats dropped into the 88%    Able to cough up some mucus  Using vest therapy  Still had a little bit of congestion but does not feel significantly in need of a bronchoscopy at this time          ROS:  A comprehensive review of systems was negative except for: above    OBJECTIVE    Medications    Continuous Infusions:   sodium chloride Stopped (12/22/21 2013)       Scheduled Meds:   azithromycin  500 mg IntraVENous Q24H    cefTRIAXone (ROCEPHIN) IV  1,000 mg IntraVENous Q24H    acetylcysteine  600 mg Inhalation BID    guaiFENesin  600 mg Oral BID    albuterol  2.5 mg Nebulization TID    sodium chloride flush  5-40 mL IntraVENous 2 times per day    enoxaparin  40 mg SubCUTAneous Daily    escitalopram  10 mg Oral Daily    pantoprazole  40 mg Oral QAM AC       PRN Meds:  guaiFENesin-codeine, albuterol, acetaminophen, pseudoephedrine, sodium chloride flush, sodium chloride, ondansetron **OR** ondansetron, polyethylene glycol, acetaminophen **OR** acetaminophen    Physical    Patient Vitals for the past 24 hrs:   BP Temp Temp src Pulse Resp SpO2   12/23/21 0913 -- -- -- -- -- 90 %   12/23/21 0900 116/61 98.4 °F (36.9 °C) Oral 87 22 (!) 87 %   12/23/21 0847 -- -- -- -- -- 90 %   12/23/21 0420 134/73 98.8 °F (37.1 °C) Oral 74 18 95 %   12/22/21 2348 121/71 99 °F (37.2 °C) Oral 69 18 95 %   12/22/21 2020 -- -- -- -- -- 92 %   12/22/21 1950 119/70 98.4 °F (36.9 °C) Oral 85 18 95 %   12/22/21 1213 122/68 97.9 °F (36.6 °C) Oral 80 -- 92 %   12/22/21 1200 -- -- -- -- -- 90 %        Physical Exam  Vitals and nursing note reviewed. Constitutional:       General: She is not in acute distress. Appearance: Normal appearance. HENT:      Head: Normocephalic and atraumatic.       Right Ear: External ear normal.      Left Ear: External ear normal.      Nose: Nose normal.      Mouth/Throat:      Mouth: Mucous membranes are moist.      Pharynx: Oropharynx is clear. Eyes:      General:         Right eye: No discharge. Left eye: No discharge. Extraocular Movements: Extraocular movements intact. Conjunctiva/sclera: Conjunctivae normal.      Pupils: Pupils are equal, round, and reactive to light. Cardiovascular:      Rate and Rhythm: Normal rate and regular rhythm. Pulses: Normal pulses. Heart sounds: No murmur heard. Pulmonary:      Effort: No respiratory distress. Breath sounds: No stridor. No wheezing or rhonchi. Comments: Decreased breath sounds bilaterally  Chest:      Chest wall: No tenderness. Abdominal:      General: Bowel sounds are normal. There is no distension. Palpations: Abdomen is soft. There is no mass. Tenderness: There is no abdominal tenderness. Hernia: No hernia is present. Musculoskeletal:         General: No swelling. Normal range of motion. Cervical back: Normal range of motion. No rigidity. Skin:     General: Skin is warm and dry. Coloration: Skin is not jaundiced or pale. Neurological:      General: No focal deficit present. Mental Status: She is alert and oriented to person, place, and time. Mental status is at baseline. Cranial Nerves: No cranial nerve deficit. Sensory: No sensory deficit. Psychiatric:         Mood and Affect: Mood normal.         Behavior: Behavior normal.         Thought Content: Thought content normal.              Weight  Weight change:       Labs:   Recent Labs     12/20/21  1838 12/21/21  0608   WBC 6.9 5.1   HGB 13.3 12.0   HCT 39.4 35.9*    222      Recent Labs     12/21/21  0608 12/22/21  0716 12/23/21  0643    144 142   K 3.2* 3.7 3.6    108 105   CO2 23 25 28   BUN 18 16 16   GLUCOSE 203* 138* 98       Additional labs      Radiology, images personally reviewed.   Not indicated        Assessment:     Active Problems:    Pneumonia    Acute and chronic respiratory failure with hypoxia (HCC)    Bronchiectasis ([address]    Acute bronchitis  Resolved Problems:    * No resolved hospital problems. *      Discussion /Plan:     Bronchiectasis with acute bronchitis  Chest x-ray showed slight increase in infiltrative process but could not be 100% as the patient does have an underlying bronchiectasis  Procalcitonin is positive    Continue with  Azithromycin  Rocephin  Mucomyst with albuterol nebulized  Mucinex  Vest therapy      I talked to her about considering doing a bronchoscopy as the patient is still having issues with hypoxemia  She did have some response to the nebulized therapy and the vest therapy    I will place her on the schedule for later this afternoon  She is free to refuse however at this time because of the holiday coming up I want to make sure that if we need a bronchoscopy we do it earlier rather than later    N.p.o. now    On maximal therapy at this time we will see how she does  Continue to wean oxygen as tolerated        Obstructive sleep apnea  Would suggest that she get a sleep study as she does have problems with this        GERD   continue with Protonix       For the bronchoscopy  ASA is 2  Mallampati class II      Myron Cerda MD    [facility] Pulmonary, Critical Care and Sleep Medicine  [phone]      Please note that some or all of this record was generated using voice recognition software. If there are any questions about the content of this document, please contact the author as some errors in transcription may have occurred.

## 2021-12-24 VITALS
SYSTOLIC BLOOD PRESSURE: 100 MMHG | DIASTOLIC BLOOD PRESSURE: 63 MMHG | HEART RATE: 72 BPM | OXYGEN SATURATION: 95 % | RESPIRATION RATE: 20 BRPM | HEIGHT: 64 IN | WEIGHT: 148.9 LBS | TEMPERATURE: 98.3 F | BODY MASS INDEX: 25.42 KG/M2

## 2021-12-24 LAB
ANION GAP SERPL CALCULATED.3IONS-SCNC: 9 MMOL/L (ref 3–16)
BUN BLDV-MCNC: 13 MG/DL (ref 7–20)
CALCIUM SERPL-MCNC: 8.8 MG/DL (ref 8.3–10.6)
CHLORIDE BLD-SCNC: 103 MMOL/L (ref 99–110)
CO2: 29 MMOL/L (ref 21–32)
CREAT SERPL-MCNC: 0.6 MG/DL (ref 0.6–1.2)
GFR AFRICAN AMERICAN: >60
GFR NON-AFRICAN AMERICAN: >60
GLUCOSE BLD-MCNC: 111 MG/DL (ref 70–99)
MAGNESIUM: 2.1 MG/DL (ref 1.8–2.4)
POTASSIUM REFLEX MAGNESIUM: 3.4 MMOL/L (ref 3.5–5.1)
SODIUM BLD-SCNC: 141 MMOL/L (ref 136–145)

## 2021-12-24 PROCEDURE — 94640 AIRWAY INHALATION TREATMENT: CPT

## 2021-12-24 PROCEDURE — 83735 ASSAY OF MAGNESIUM: CPT

## 2021-12-24 PROCEDURE — 2700000000 HC OXYGEN THERAPY PER DAY

## 2021-12-24 PROCEDURE — 6370000000 HC RX 637 (ALT 250 FOR IP): Performed by: INTERNAL MEDICINE

## 2021-12-24 PROCEDURE — 6360000002 HC RX W HCPCS: Performed by: INTERNAL MEDICINE

## 2021-12-24 PROCEDURE — 94761 N-INVAS EAR/PLS OXIMETRY MLT: CPT

## 2021-12-24 PROCEDURE — 80048 BASIC METABOLIC PNL TOTAL CA: CPT

## 2021-12-24 PROCEDURE — 6370000000 HC RX 637 (ALT 250 FOR IP): Performed by: NURSE PRACTITIONER

## 2021-12-24 PROCEDURE — 2580000003 HC RX 258: Performed by: NURSE PRACTITIONER

## 2021-12-24 PROCEDURE — 6360000002 HC RX W HCPCS: Performed by: NURSE PRACTITIONER

## 2021-12-24 PROCEDURE — 94669 MECHANICAL CHEST WALL OSCILL: CPT

## 2021-12-24 PROCEDURE — 36415 COLL VENOUS BLD VENIPUNCTURE: CPT

## 2021-12-24 PROCEDURE — 2580000003 HC RX 258: Performed by: INTERNAL MEDICINE

## 2021-12-24 PROCEDURE — 99232 SBSQ HOSP IP/OBS MODERATE 35: CPT | Performed by: INTERNAL MEDICINE

## 2021-12-24 RX ORDER — AZITHROMYCIN 250 MG/1
250 TABLET, FILM COATED ORAL SEE ADMIN INSTRUCTIONS
Qty: 6 TABLET | Refills: 0 | Status: SHIPPED | OUTPATIENT
Start: 2021-12-24 | End: 2021-12-29

## 2021-12-24 RX ORDER — ONDANSETRON 4 MG/1
4 TABLET, ORALLY DISINTEGRATING ORAL EVERY 6 HOURS PRN
Qty: 21 TABLET | Refills: 0 | Status: SHIPPED | OUTPATIENT
Start: 2021-12-24 | End: 2021-12-31

## 2021-12-24 RX ORDER — CODEINE PHOSPHATE AND GUAIFENESIN 10; 100 MG/5ML; MG/5ML
5 SOLUTION ORAL EVERY 4 HOURS PRN
Qty: 210 ML | Refills: 0 | Status: SHIPPED | OUTPATIENT
Start: 2021-12-24 | End: 2021-12-31

## 2021-12-24 RX ORDER — ALBUTEROL SULFATE 2.5 MG/3ML
2.5 SOLUTION RESPIRATORY (INHALATION) 2 TIMES DAILY
Status: DISCONTINUED | OUTPATIENT
Start: 2021-12-24 | End: 2021-12-24 | Stop reason: HOSPADM

## 2021-12-24 RX ADMIN — SODIUM CHLORIDE 25 ML: 9 INJECTION, SOLUTION INTRAVENOUS at 17:08

## 2021-12-24 RX ADMIN — ACETAMINOPHEN 1000 MG: 500 TABLET ORAL at 05:40

## 2021-12-24 RX ADMIN — GUAIFENESIN AND CODEINE PHOSPHATE 5 ML: 10; 100 LIQUID ORAL at 14:48

## 2021-12-24 RX ADMIN — GUAIFENESIN AND CODEINE PHOSPHATE 5 ML: 10; 100 LIQUID ORAL at 00:37

## 2021-12-24 RX ADMIN — ONDANSETRON 4 MG: 2 INJECTION INTRAMUSCULAR; INTRAVENOUS at 14:48

## 2021-12-24 RX ADMIN — GUAIFENESIN 600 MG: 600 TABLET, EXTENDED RELEASE ORAL at 10:27

## 2021-12-24 RX ADMIN — ESCITALOPRAM OXALATE 10 MG: 10 TABLET ORAL at 10:27

## 2021-12-24 RX ADMIN — ALBUTEROL SULFATE 2.5 MG: 2.5 SOLUTION RESPIRATORY (INHALATION) at 08:55

## 2021-12-24 RX ADMIN — AZITHROMYCIN MONOHYDRATE 500 MG: 500 INJECTION, POWDER, LYOPHILIZED, FOR SOLUTION INTRAVENOUS at 17:10

## 2021-12-24 RX ADMIN — CEFTRIAXONE SODIUM 1000 MG: 1 INJECTION, POWDER, FOR SOLUTION INTRAMUSCULAR; INTRAVENOUS at 16:18

## 2021-12-24 RX ADMIN — PANTOPRAZOLE SODIUM 40 MG: 40 TABLET, DELAYED RELEASE ORAL at 05:38

## 2021-12-24 RX ADMIN — SODIUM CHLORIDE 25 ML: 9 INJECTION, SOLUTION INTRAVENOUS at 16:16

## 2021-12-24 RX ADMIN — ACETAMINOPHEN 1000 MG: 500 TABLET ORAL at 14:48

## 2021-12-24 RX ADMIN — GUAIFENESIN AND CODEINE PHOSPHATE 5 ML: 10; 100 LIQUID ORAL at 10:27

## 2021-12-24 RX ADMIN — SODIUM CHLORIDE, PRESERVATIVE FREE 10 ML: 5 INJECTION INTRAVENOUS at 10:28

## 2021-12-24 RX ADMIN — ONDANSETRON 4 MG: 4 TABLET, ORALLY DISINTEGRATING ORAL at 05:40

## 2021-12-24 ASSESSMENT — PAIN DESCRIPTION - ORIENTATION: ORIENTATION: ANTERIOR

## 2021-12-24 ASSESSMENT — PAIN DESCRIPTION - PROGRESSION: CLINICAL_PROGRESSION: GRADUALLY WORSENING

## 2021-12-24 ASSESSMENT — PAIN DESCRIPTION - DESCRIPTORS: DESCRIPTORS: ACHING;HEADACHE

## 2021-12-24 ASSESSMENT — PAIN SCALES - GENERAL
PAINLEVEL_OUTOF10: 0
PAINLEVEL_OUTOF10: 8
PAINLEVEL_OUTOF10: 3
PAINLEVEL_OUTOF10: 8

## 2021-12-24 ASSESSMENT — PAIN SCALES - WONG BAKER: WONGBAKER_NUMERICALRESPONSE: 0

## 2021-12-24 ASSESSMENT — PAIN DESCRIPTION - LOCATION: LOCATION: HEAD;BACK

## 2021-12-24 ASSESSMENT — PAIN DESCRIPTION - FREQUENCY: FREQUENCY: INTERMITTENT

## 2021-12-24 ASSESSMENT — PAIN DESCRIPTION - PAIN TYPE: TYPE: CHRONIC PAIN

## 2021-12-24 ASSESSMENT — PAIN DESCRIPTION - ONSET: ONSET: ON-GOING

## 2021-12-24 ASSESSMENT — PAIN - FUNCTIONAL ASSESSMENT: PAIN_FUNCTIONAL_ASSESSMENT: PREVENTS OR INTERFERES SOME ACTIVE ACTIVITIES AND ADLS

## 2021-12-24 NOTE — PLAN OF CARE
Problem: Infection:  Goal: Will remain free from infection  Description: Will remain free from infection  Outcome: Ongoing     Problem: Safety:  Goal: Free from accidental physical injury  Description: Free from accidental physical injury  Outcome: Ongoing  Goal: Free from intentional harm  Description: Free from intentional harm  Outcome: Ongoing     Problem: Daily Care:  Goal: Daily care needs are met  Description: Daily care needs are met  Outcome: Ongoing     Problem: Pain:  Goal: Patient's pain/discomfort is manageable  Description: Patient's pain/discomfort is manageable  Outcome: Ongoing  Goal: Pain level will decrease  Description: Pain level will decrease  Outcome: Ongoing  Goal: Control of acute pain  Description: Control of acute pain  Outcome: Ongoing  Goal: Control of chronic pain  Description: Control of chronic pain  Outcome: Ongoing     Problem: Skin Integrity:  Goal: Skin integrity will stabilize  Description: Skin integrity will stabilize  Outcome: Ongoing     Problem: Discharge Planning:  Goal: Patients continuum of care needs are met  Description: Patients continuum of care needs are met  Outcome: Ongoing     Problem: Falls - Risk of:  Goal: Will remain free from falls  Description: Will remain free from falls  Outcome: Ongoing  Goal: Absence of physical injury  Description: Absence of physical injury  Outcome: Ongoing     Continue with plan of care.

## 2021-12-24 NOTE — PROGRESS NOTES
Patient was evaluated today for the diagnosis of Cystic Fibrosis. I entered a DME order for home oxygen because the diagnosis and testing requires the patient to have supplemental oxygen. Condition will improve or be benefited by oxygen use. The patient is  able to perform good mobility in a home setting and therefore does require the use of a portable oxygen system. The need for this equipment was discussed with the patient and she understands and is in agreement.

## 2021-12-24 NOTE — PROGRESS NOTES
Patient: Ernestine Rey MRN: [id number]  Date of  Admission: 12/20/2021   YOB: 1955  Age: 77 y.o. Sex: female    Unit: [facility]  Room/Bed: 0207/0207-01 Admitting Physician: Whit ROMERO    Attending Physician:  Mabel Hurst MD         Pulmonary Service Note  Chief Complaint   Patient presents with    Cough       started saturday.  Concern For COVID-19       Rapid on saturday and flu both negative, PCR completed waiting for results. Pt O2 dopped to 85% on RA with ambulation. Cystic fibrosis, history of MILADY, bronchiectasis, bronchitis     History Obtained From   patient, electronic medical record     History of Present Illness   This is a 75-year-old female who has a diagnosis of cystic fibrosis and MILADY in the past has been on 15 months of therapy for MILADY. Patient reports that she starting to have issues with shortness of breath congestion and hypoxemia. She normally follows with  cystic fibrosis clinic. And was told to come to the closest hospital because of her O2 sats being low. Patient does not normally wear oxygen and was placed on oxygen here in the hospital.  Patient reports that she has been having some congestion. She normally takes Symbicort and albuterol along with a nebulizer with normal saline. Patient does not use vest therapy but does use Acapella. Patient reports that her mucus has been getting thicker and having a little bit of trouble getting this up. She normally does not wear oxygen.     She does report that she does snore, has witnessed apnea, fatigue and sleepiness during the daytime. Dry mouth at night also has headaches during the morning hours. These headaches do extend into the middle of the day also and is being seen by a ENT for this. Patient does have dry mouth in the middle of the night and does have issues with nocturia at times.   Patient has never had a sleep work-up but normally sleeps on her side.           SUBJECTIVE:      Oxygen requirements are little bit less down  On 3 L of oxygen  Bronchoscopy was successful with multiple mucous plugs seen throughout the airways  Tolerated this well  Feeling little bit easier to breathe today        ROS:  A comprehensive review of systems was negative except for: above    OBJECTIVE    Medications    Continuous Infusions:   sodium chloride 25 mL (12/23/21 1743)       Scheduled Meds:   albuterol  2.5 mg Nebulization BID    azithromycin  500 mg IntraVENous Q24H    cefTRIAXone (ROCEPHIN) IV  1,000 mg IntraVENous Q24H    acetylcysteine  600 mg Inhalation BID    guaiFENesin  600 mg Oral BID    sodium chloride flush  5-40 mL IntraVENous 2 times per day    enoxaparin  40 mg SubCUTAneous Daily    escitalopram  10 mg Oral Daily    pantoprazole  40 mg Oral QAM AC       PRN Meds:  guaiFENesin-codeine, albuterol, acetaminophen, pseudoephedrine, sodium chloride flush, sodium chloride, ondansetron **OR** ondansetron, polyethylene glycol, acetaminophen **OR** acetaminophen    Physical    Patient Vitals for the past 24 hrs:   BP Temp Temp src Pulse Resp SpO2   12/24/21 0856 -- -- -- -- 20 95 %   12/24/21 0759 100/63 98.3 °F (36.8 °C) Oral 72 18 94 %   12/24/21 0348 121/77 98.2 °F (36.8 °C) Oral 71 16 98 %   12/23/21 2349 104/61 98.3 °F (36.8 °C) Oral 76 16 97 %   12/23/21 2007 123/63 98.1 °F (36.7 °C) Oral 82 18 90 %   12/23/21 1933 -- -- -- -- 18 92 %   12/23/21 1609 (!) 106/38 -- -- 95 20 94 %   12/23/21 1559 (!) 119/57 -- -- 91 20 91 %   12/23/21 1545 127/78 98.6 °F (37 °C) Temporal 98 28 92 %   12/23/21 1535 127/74 -- -- 95 25 98 %   12/23/21 1530 (!) 133/52 98.6 °F (37 °C) Temporal 102 -- 100 %   12/23/21 1332 (!) 134/59 99.8 °F (37.7 °C) Temporal 79 20 --        Physical Exam  Vitals and nursing note reviewed. Constitutional:       General: She is not in acute distress. Appearance: Normal appearance. HENT:      Head: Normocephalic and atraumatic.       Right Ear: External ear normal.      Left Ear: External ear normal.      Nose: Nose normal.      Mouth/Throat:      Mouth: Mucous membranes are moist.      Pharynx: Oropharynx is clear. Eyes:      General:         Right eye: No discharge. Left eye: No discharge. Extraocular Movements: Extraocular movements intact. Conjunctiva/sclera: Conjunctivae normal.      Pupils: Pupils are equal, round, and reactive to light. Cardiovascular:      Rate and Rhythm: Normal rate and regular rhythm. Pulses: Normal pulses. Heart sounds: No murmur heard. Pulmonary:      Effort: No respiratory distress. Breath sounds: No stridor. No wheezing or rhonchi. Comments: Decreased breath sounds bilaterally  Chest:      Chest wall: No tenderness. Abdominal:      General: Bowel sounds are normal. There is no distension. Palpations: Abdomen is soft. There is no mass. Tenderness: There is no abdominal tenderness. Hernia: No hernia is present. Musculoskeletal:         General: No swelling. Normal range of motion. Cervical back: Normal range of motion. No rigidity. Skin:     General: Skin is warm and dry. Coloration: Skin is not jaundiced or pale. Neurological:      General: No focal deficit present. Mental Status: She is alert and oriented to person, place, and time. Mental status is at baseline. Cranial Nerves: No cranial nerve deficit. Sensory: No sensory deficit. Psychiatric:         Mood and Affect: Mood normal.         Behavior: Behavior normal.         Thought Content: Thought content normal.              Weight  Weight change:       Labs:   No results for input(s): WBC, HGB, HCT, PLT in the last 72 hours. Recent Labs     12/22/21  0716 12/23/21  0643 12/24/21  0639    142 141   K 3.7 3.6 3.4*    105 103   CO2 25 28 29   BUN 16 16 13   GLUCOSE 138* 98 111*       Additional labs      Radiology, images personally reviewed.   Not indicated        Assessment:     Active Problems:    Pneumonia    Acute and chronic respiratory failure with hypoxia (HCC)    Bronchiectasis (HCC)    Acute bronchitis    Cystic fibrosis ([address]  Resolved Problems:    * No resolved hospital problems. *      Discussion /Plan:     Bronchiectasis with acute bronchitis  Chest x-ray showed slight increase in infiltrative process but could not be 100% as the patient does have an underlying bronchiectasis  Procalcitonin is positive    Continue with  Azithromycin  Rocephin  Mucomyst with albuterol nebulized, has normal saline nebulizer at home  Mucinex-do this twice daily for the next 1 weeks  Vest therapy-not effective. 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 Mycobacterium infection    Patient requiring 3 L of oxygen may need this to go home with  I would probably give the azithromycin and Rocephin 1 more IV dose tonight and then send her home  I would send her home with a Z-Chandler    She can follow-up with pulmonology at [facility]      On maximal therapy at this time we will see how she does  Continue to wean oxygen as tolerated        Obstructive sleep apnea  Would suggest that she get a sleep study as she does have problems with this        GERD   continue with Protonix     Okay to discharge today or tomorrow        Andre Ayoub MD    [facility] Pulmonary, Critical Care and Sleep Medicine  [phone]      Please note that some or all of this record was generated using voice recognition software. If there are any questions about the content of this document, please contact the author as some errors in transcription may have occurred.

## 2021-12-24 NOTE — PROGRESS NOTES
No acute events overnight. Patient remains on 5 lpm O2, Spo2 97-98%. Voiced c/o headache, medicated with Tylenol per MAR. Up ad holli to bathroom, steady gait, voided x2 occurrences. Continue with hourly rounding.

## 2021-12-24 NOTE — PLAN OF CARE
Problem: Infection:  Goal: Will remain free from infection  Description: Will remain free from infection  Outcome: Ongoing     Problem: Safety:  Goal: Free from accidental physical injury  Description: Free from accidental physical injury  Outcome: Ongoing     Problem: Pain:  Goal: Pain level will decrease  Description: Pain level will decrease  Outcome: Ongoing     Problem: Falls - Risk of:  Goal: Will remain free from falls  Description: Will remain free from falls  Outcome: Ongoing

## 2021-12-24 NOTE — PROGRESS NOTES
12/24/21 0858   RT Protocol   History Pulmonary Disease 1   Respiratory pattern 0   Breath sounds 2   Cough 0   Bronchodilator Assessment Score 3

## 2021-12-24 NOTE — CARE COORDINATION
Oxygen documentation:     O2 saturation at REST on ROOM AIR = _84__% , patient oxygen saturation is 91% on 4L. *If saturation is 89% or above please proceed with steps 2 and 3. .........      O2 saturation with AMBULATION of _____ feet on ROOM AIR = _____%   O2 saturation with AMBULATION on current liter flow = ______%     DCP notified: __x____     Signature: ___AP, RN__________________ Date: _12/24/21____   Time: __1142_

## 2021-12-24 NOTE — RT PROTOCOL NOTE
RT Inhaler-Nebulizer Bronchodilator Protocol Note    There is a bronchodilator order in the chart from a provider indicating to follow the RT Bronchodilator Protocol and there is an Initiate RT Inhaler-Nebulizer Bronchodilator Protocol order as well (see protocol at bottom of note). CXR Findings:  No results found. The findings from the last RT Protocol Assessment were as follows:   History Pulmonary Disease: Smoker 15 pack years or more  Respiratory Pattern: Regular pattern and RR 12-20 bpm  Breath Sounds: Slightly diminished and/or crackles  Cough: Strong, spontaneous, non-productive  Indication for Bronchodilator Therapy: Mucolytic ordered,On home bronchodilators  Bronchodilator Assessment Score: 3    Aerosolized bronchodilator medication orders have been revised according to the RT Inhaler-Nebulizer Bronchodilator Protocol below. Respiratory Therapist to perform RT Therapy Protocol Assessment initially then follow the protocol. Repeat RT Therapy Protocol Assessment PRN for score 0-3 or on second treatment, BID, and PRN for scores above 3. No Indications - adjust the frequency to every 6 hours PRN wheezing or bronchospasm, if no treatments needed after 48 hours then discontinue using Per Protocol order mode. If indication present, adjust the RT bronchodilator orders based on the Bronchodilator Assessment Score as indicated below. Use Inhaler orders unless patient has one or more of the following: on home nebulizer, not able to hold breath for 10 seconds, is not alert and oriented, cannot activate and use MDI correctly, or respiratory rate 25 breaths per minute or more, then use the equivalent nebulizer order(s) with same Frequency and PRN reasons based on the score. If a patient is on this medication at home then do not decrease Frequency below that used at home.     0-3 - enter or revise RT bronchodilator order(s) to equivalent RT Bronchodilator order with Frequency of every 4 hours PRN for wheezing or increased work of breathing using Per Protocol order mode. 4-6 - enter or revise RT Bronchodilator order(s) to two equivalent RT bronchodilator orders with one order with BID Frequency and one order with Frequency of every 4 hours PRN wheezing or increased work of breathing using Per Protocol order mode. 7-10 - enter or revise RT Bronchodilator order(s) to two equivalent RT bronchodilator orders with one order with TID Frequency and one order with Frequency of every 4 hours PRN wheezing or increased work of breathing using Per Protocol order mode. 11-13 - enter or revise RT Bronchodilator order(s) to one equivalent RT bronchodilator order with QID Frequency and an Albuterol order with Frequency of every 4 hours PRN wheezing or increased work of breathing using Per Protocol order mode. Greater than 13 - enter or revise RT Bronchodilator order(s) to one equivalent RT bronchodilator order with every 4 hours Frequency and an Albuterol order with Frequency of every 2 hours PRN wheezing or increased work of breathing using Per Protocol order mode. RT to enter RT Home Evaluation for COPD & MDI Assessment order using Per Protocol order mode.     Electronically signed by Lori Ernst RCP on 12/24/2021 at 8:58 AM

## 2021-12-24 NOTE — CARE COORDINATION
CASE MANAGEMENT DISCHARGE SUMMARY      Discharge to: home w/[employer] home O2  IMM given: (date) 12/24/2021    New Durable Medical Equipment ordered/agency: home O2     Transportation: private  Confirmed discharge plan with:     Patient: yes     Family:  Abdiel Pena, [phone]   RN, name: AUDIE Hoff    Note: CM spoke to [facility] w/[employer], who stated that pt was approved and that they will deliver concentrator and reach out to spouse. Pt able to d/c once IV ABX given and CM delivers 2 tanks to room. CM will instruct pt on how to use tanks.   Lucille Sheikh, RN

## 2021-12-25 LAB
CULTURE, RESPIRATORY: NORMAL
GRAM STAIN RESULT: NORMAL

## 2021-12-28 LAB
ADENOVIRUS PCR: NOT DETECTED
HUMAN METAPNEUMOVIRUS PCR: NOT DETECTED
INFLUENZA A: NOT DETECTED
INFLUENZA B: NOT DETECTED
PARAINFLUENZA 1 PCR: NOT DETECTED
PARAINFLUENZA 2 PCR: NOT DETECTED
PARAINFLUENZA 3 PCR: NOT DETECTED
PARAINFLUENZA 4 PCR: NOT DETECTED
RHINO/ENTEROVIRUS PCR: NOT DETECTED
RSV BY PCR: NOT DETECTED
RSV SOURCE: NORMAL

## 2022-01-01 LAB
FINAL REPORT: NORMAL
PRELIMINARY: NORMAL

## 2022-01-07 LAB
INFLUENZA A BY PCR: NORMAL
INFLUENZA B BY PCR: NORMAL
RSV BY PCR: NORMAL
RSV SOURCE: NORMAL

## 2022-01-11 LAB
AFB CULTURE (MYCOBACTERIA): ABNORMAL
AFB CULTURE (MYCOBACTERIA): ABNORMAL
AFB SMEAR: ABNORMAL
ORGANISM: ABNORMAL
ORGANISM: ABNORMAL

## 2022-01-24 LAB
FUNGUS (MYCOLOGY) CULTURE: NORMAL
FUNGUS STAIN: NORMAL

## 2022-01-25 NOTE — DISCHARGE SUMMARY
Hospital Medicine Discharge Summary    Patient: Jesica Morley     Age: 77 y.o. Gender: female  : 1955   MRN: [id number]  Code Status: Full     Admit Date: 2021   Discharge Date: 2021    Disposition:  Home     Condition at Discharge: Stable    Primary Care Provider: Lane Quintana MD    Admitting Physician: Adrianna Jones MD  Discharge Physician: Adrianna Jones MD       Discharge Diagnoses: Active Hospital Problems    Diagnosis     Cystic fibrosis ([facility] [E84.9]     Bronchiectasis ([facility] [J47.9]     Acute bronchitis [J20.9]     Pneumonia [J18.9]     Acute and chronic respiratory failure with hypoxia [facility]) [J96.21]        Hospital Course:   77 y.o. female, with essentially no PMH, who presented to [facility] with shortness of breath and cough. History obtained from the patient and review of EMR. The patient stated she has been experiencing shortness of breath with a dry cough since Friday night. She stated on Saturday she went to Urgent care and had a Rapid COVID and Influenza test which were both negative. The patient stated a PCR was sent for COVID, but she is still waiting on results. She stated over the last 2 days her shortness of breath and cough has been getting progressively worse. The patient stated today she began to have a fever of 101 as well as body aches. She stated she was monitoring her SPO2 at home and it was anywhere from 88-91% so she went to the ED for further evaluation. The patient stated she is vaccinated for COVID and has her booster. She continues to feel bad despite taking advil, tylenol and codeine cough syrup. In the emergency room the patients SPO2 decreased to 84% on RA and she was placed on 3 LNC. CXR was obtained that revealed bilateral airspace opacities compatible with infection including  COVID 19 pneumonia. The patients rapid COVID and influenza here at [facility] were also negative.  She was given steroids and breathing treatments in the ED. 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 Jacklyn Jenkins from [facility] pulmonology. Assessment/Plan:    Acute respiratory failure with hypoxia, Possible Pneumonia vs Acute Bronchitis in setting of Bronchiectasis:  -CXR showed bilateral airspace opacities but with increased infiltrative process in left base concerning for pneumonia,   -Rapid COVID negative. Recent negative COVID PCR as outpatient on Friday. -influenza a and b negative  -Given above infiltrates, elevated Procalcitonin, fevers, treated with empiric Abx for CAP vs Acute Bronchitis  -Continue Rocephin and Ceftriaxone  -Pulmonology consulted for input given underlying Bronchiectasis  -Continued pulmonary toilet with chest vest, acapella, mucinex  -Given worsening symptoms/hypoxia, Bronchoscopy was completed  -Home O2 arranged. Exam:   /63   Pulse 72   Temp 98.3 °F (36.8 °C) (Oral)   Resp 20   Ht 5' 4\" (1.626 m)   Wt 148 lb 14.4 oz (67.5 kg)   SpO2 95%   BMI 25.56 kg/m²   General appearance:  No apparent distress, appears stated age and cooperative. HEENT:  Pupils equal, round, and reactive to light. Conjunctivae/corneas clear. Neck:  Supple, no jugular venous distention. Trachea midline with full range of motion. Respiratory:  Normal respiratory effort. Improved air movement noted, clear to auscultation, bilaterally without Rales/Wheezes/Rhonchi. Cardiovascular:  Regular rate and rhythm with normal S1/S2 without murmurs, rubs or gallops. Abdomen:  Soft, non-tender, non-distended with normal bowel sounds. Musculoskelatal:  No clubbing, cyanosis or edema bilaterally. Full range of motion without deformity. Neurologic:  Neurovascularly intact without any focal sensory/motor deficits.  Cranial nerves: II-XII intact, grossly non-focal.  Psychiatric:  Alert and oriented, thought content appropriate, normal insight  Skin:  Skin color, texture, turgor normal.  No rashes or lesions. Capillary Refill:  Brisk,< 3 seconds   Peripheral Pulses:  +2 palpable, equal bilaterally       Patient Discharge Instructions: Follow-up with PCP and Pulmonology    Discharge Medications:   Discharge Medication List as of 12/24/2021  7:39 PM      START taking these medications    Details   guaiFENesin-codeine (GUAIFENESIN AC) 100-10 MG/5ML liquid Take 5 mLs by mouth every 4 hours as needed for Cough for up to 7 days. , Disp-210 mL, R-0Normal      azithromycin (ZITHROMAX) 250 MG tablet Take 1 tablet by mouth See Admin Instructions for 5 days 500mg on day 1 followed by 250mg on days 2 - 5, Disp-6 tablet, R-0Normal      ondansetron (ZOFRAN ODT) 4 MG disintegrating tablet Place 1 tablet under the tongue every 6 hours as needed for Nausea or Vomiting, Disp-21 tablet, R-0Normal           Discharge Medication List as of 12/24/2021  7:39 PM        Discharge Medication List as of 12/24/2021  7:39 PM      CONTINUE these medications which have NOT CHANGED    Details   escitalopram (LEXAPRO) 10 MG tablet Take 10 mg by mouth dailyHistorical Med      omeprazole (PRILOSEC) 20 MG delayed release capsule Take 20 mg by mouth dailyHistorical Med      albuterol sulfate  (90 Base) MCG/ACT inhaler Historical Med      budesonide-formoterol (SYMBICORT) 160-4.5 MCG/ACT AERO Historical Med      acetaminophen (TYLENOL) 500 MG tablet Take 1,000 mg by mouth every 6 hours as needed for PainHistorical Med      pseudoephedrine (SUDAFED) 30 MG tablet Take 60 mg by mouth every 4 hours as needed for CongestionHistorical Med      LORazepam (ATIVAN) 0.5 MG tablet Take 0.5 mg by mouth every 6 hours as needed for Anxiety      guaiFENesin (MUCINEX) 600 MG SR tablet Take 1,200 mg by mouth 2 times daily as needed for Congestion      Loratadine (CLARITIN) 10 MG CAPS Take 10 mg by mouth daily as needed           Discharge Medication List as of 12/24/2021  7:39 PM      STOP taking these medications       amitriptyline (ELAVIL) 25 MG tablet

## 2024-01-19 ENCOUNTER — WALK IN (OUTPATIENT)
Dept: URGENT CARE | Age: 69
End: 2024-01-19
Attending: EMERGENCY MEDICINE

## 2024-01-19 VITALS
OXYGEN SATURATION: 95 % | TEMPERATURE: 97.5 F | HEART RATE: 85 BPM | SYSTOLIC BLOOD PRESSURE: 110 MMHG | RESPIRATION RATE: 16 BRPM | WEIGHT: 145 LBS | DIASTOLIC BLOOD PRESSURE: 75 MMHG

## 2024-01-19 DIAGNOSIS — R39.89 URINARY PROBLEM: Primary | ICD-10-CM

## 2024-01-19 DIAGNOSIS — N30.01 ACUTE CYSTITIS WITH HEMATURIA: ICD-10-CM

## 2024-01-19 LAB
APPEARANCE, POC: ABNORMAL
BILIRUBIN, POC: NEGATIVE
COLOR, POC: YELLOW
GLUCOSE UR-MCNC: NEGATIVE MG/DL
KETONES, POC: ABNORMAL MG/DL
NITRITE, POC: NEGATIVE
OCCULT BLOOD, POC: ABNORMAL
PH UR: 6 [PH] (ref 5–7)
PROT UR-MCNC: NEGATIVE MG/DL
SP GR UR: 1.03 (ref 1–1.03)
UROBILINOGEN UR-MCNC: 0.2 MG/DL (ref 0–1)
WBC (LEUKOCYTE) ESTERASE, POC: ABNORMAL

## 2024-01-19 PROCEDURE — 81002 URINALYSIS NONAUTO W/O SCOPE: CPT | Performed by: EMERGENCY MEDICINE

## 2024-01-19 PROCEDURE — 87086 URINE CULTURE/COLONY COUNT: CPT | Performed by: EMERGENCY MEDICINE

## 2024-01-19 RX ORDER — NITROFURANTOIN 25; 75 MG/1; MG/1
100 CAPSULE ORAL 2 TIMES DAILY
Qty: 14 CAPSULE | Refills: 0 | Status: SHIPPED | OUTPATIENT
Start: 2024-01-19 | End: 2024-01-26

## 2024-01-19 RX ORDER — ONDANSETRON 4 MG/1
4 TABLET, ORALLY DISINTEGRATING ORAL
COMMUNITY
Start: 2023-09-07

## 2024-01-19 RX ORDER — OMEPRAZOLE 40 MG/1
40 CAPSULE, DELAYED RELEASE ORAL
COMMUNITY
Start: 2023-08-17

## 2024-01-19 RX ORDER — PSEUDOEPHEDRINE HCL 30 MG
60 TABLET ORAL
COMMUNITY

## 2024-01-19 RX ORDER — ESCITALOPRAM OXALATE 10 MG/1
10 TABLET ORAL
COMMUNITY
Start: 2023-10-05

## 2024-01-19 RX ORDER — GUAIFENESIN 600 MG/1
1200 TABLET, EXTENDED RELEASE ORAL
COMMUNITY

## 2024-01-19 RX ORDER — PRENATAL VIT 49/IRON FUM/FOLIC 6.75-0.2MG
1 TABLET ORAL 2 TIMES DAILY
COMMUNITY

## 2024-01-19 RX ORDER — LORATADINE 10 MG/1
10 CAPSULE, LIQUID FILLED ORAL DAILY PRN
COMMUNITY

## 2024-01-19 ASSESSMENT — PAIN SCALES - GENERAL: PAINLEVEL: 2

## 2024-01-20 LAB — BACTERIA UR CULT: NORMAL

## 2024-01-21 LAB — BACTERIA UR CULT: ABNORMAL

## (undated) DEVICE — LINER,SOFT,SUCTION CANISTER,1500CC: Brand: MEDLINE

## (undated) DEVICE — SINGLE USE SUCTION VALVE MAJ-209: Brand: SINGLE USE SUCTION VALVE (STERILE)

## (undated) DEVICE — TUBING, SUCTION, 3/16" X 12', STRAIGHT: Brand: MEDLINE

## (undated) DEVICE — ENDOSCOPIC KIT 2 12 FT OP4 DE2 GWN SYR

## (undated) DEVICE — CONMED SCOPE SAVER BITE BLOCK, 20X27 MM: Brand: SCOPE SAVER

## (undated) DEVICE — SINGLE USE BIOPSY VALVE MAJ-210: Brand: SINGLE USE BIOPSY VALVE (STERILE)

## (undated) DEVICE — TUBING, SUCTION, 3/16" X 6', STRAIGHT: Brand: MEDLINE

## (undated) DEVICE — YANKAUER,BULB TIP,W/O VENT,RIGID,STERILE: Brand: MEDLINE

## (undated) DEVICE — CANNULA,OXY,ADULT,SUPERSOFT,W/7'TUB,SC: Brand: MEDLINE INDUSTRIES, INC.

## (undated) DEVICE — SYRINGE MED 50ML LUERSLIP TIP

## (undated) DEVICE — ELECTRODE,RADIOTRANSLUCENT,FOAM,3PK: Brand: MEDLINE

## (undated) DEVICE — TRAP,MUCUS SPECIMEN, 80CC: Brand: MEDLINE

## (undated) DEVICE — BOWL MED M 16OZ PLAS CAP GRAD

## (undated) DEVICE — SYRINGE MED 10ML SLIP TIP BLNT FILL AND LUERLOCK DISP